# Patient Record
Sex: MALE | Race: WHITE | URBAN - METROPOLITAN AREA
[De-identification: names, ages, dates, MRNs, and addresses within clinical notes are randomized per-mention and may not be internally consistent; named-entity substitution may affect disease eponyms.]

---

## 2017-07-22 ENCOUNTER — EMERGENCY (EMERGENCY)
Facility: HOSPITAL | Age: 47
LOS: 1 days | Discharge: HOME | End: 2017-07-22
Attending: EMERGENCY MEDICINE
Payer: COMMERCIAL

## 2017-07-22 VITALS
RESPIRATION RATE: 16 BRPM | TEMPERATURE: 98 F | SYSTOLIC BLOOD PRESSURE: 146 MMHG | OXYGEN SATURATION: 96 % | DIASTOLIC BLOOD PRESSURE: 94 MMHG | HEART RATE: 90 BPM

## 2017-07-22 VITALS
DIASTOLIC BLOOD PRESSURE: 100 MMHG | TEMPERATURE: 97 F | OXYGEN SATURATION: 93 % | HEART RATE: 96 BPM | HEIGHT: 74 IN | SYSTOLIC BLOOD PRESSURE: 157 MMHG | WEIGHT: 315 LBS | RESPIRATION RATE: 18 BRPM

## 2017-07-22 LAB
ALBUMIN SERPL ELPH-MCNC: 4.5 G/DL — SIGNIFICANT CHANGE UP (ref 3.3–5.2)
ALP SERPL-CCNC: 79 U/L — SIGNIFICANT CHANGE UP (ref 40–120)
ALT FLD-CCNC: 20 U/L — SIGNIFICANT CHANGE UP
ANION GAP SERPL CALC-SCNC: 19 MMOL/L — HIGH (ref 5–17)
AST SERPL-CCNC: 22 U/L — SIGNIFICANT CHANGE UP
BASOPHILS # BLD AUTO: 0 K/UL — SIGNIFICANT CHANGE UP (ref 0–0.2)
BASOPHILS NFR BLD AUTO: 0.1 % — SIGNIFICANT CHANGE UP (ref 0–2)
BILIRUB SERPL-MCNC: 0.3 MG/DL — LOW (ref 0.4–2)
BUN SERPL-MCNC: 11 MG/DL — SIGNIFICANT CHANGE UP (ref 8–20)
CALCIUM SERPL-MCNC: 8.8 MG/DL — SIGNIFICANT CHANGE UP (ref 8.6–10.2)
CHLORIDE SERPL-SCNC: 103 MMOL/L — SIGNIFICANT CHANGE UP (ref 98–107)
CO2 SERPL-SCNC: 21 MMOL/L — LOW (ref 22–29)
CREAT SERPL-MCNC: 1.42 MG/DL — HIGH (ref 0.5–1.3)
EOSINOPHIL # BLD AUTO: 0 K/UL — SIGNIFICANT CHANGE UP (ref 0–0.5)
EOSINOPHIL NFR BLD AUTO: 0.1 % — SIGNIFICANT CHANGE UP (ref 0–5)
ETHANOL SERPL-MCNC: 170 MG/DL — SIGNIFICANT CHANGE UP
GLUCOSE SERPL-MCNC: 127 MG/DL — HIGH (ref 70–115)
HCT VFR BLD CALC: 45.7 % — SIGNIFICANT CHANGE UP (ref 42–52)
HGB BLD-MCNC: 15.5 G/DL — SIGNIFICANT CHANGE UP (ref 14–18)
LYMPHOCYTES # BLD AUTO: 1.7 K/UL — SIGNIFICANT CHANGE UP (ref 1–4.8)
LYMPHOCYTES # BLD AUTO: 14.1 % — LOW (ref 20–55)
MCHC RBC-ENTMCNC: 29.4 PG — SIGNIFICANT CHANGE UP (ref 27–31)
MCHC RBC-ENTMCNC: 33.9 G/DL — SIGNIFICANT CHANGE UP (ref 32–36)
MCV RBC AUTO: 86.7 FL — SIGNIFICANT CHANGE UP (ref 80–94)
MONOCYTES # BLD AUTO: 0.4 K/UL — SIGNIFICANT CHANGE UP (ref 0–0.8)
MONOCYTES NFR BLD AUTO: 3.4 % — SIGNIFICANT CHANGE UP (ref 3–10)
NEUTROPHILS # BLD AUTO: 10 K/UL — HIGH (ref 1.8–8)
NEUTROPHILS NFR BLD AUTO: 81.8 % — HIGH (ref 37–73)
PLATELET # BLD AUTO: 271 K/UL — SIGNIFICANT CHANGE UP (ref 150–400)
POTASSIUM SERPL-MCNC: 3.5 MMOL/L — SIGNIFICANT CHANGE UP (ref 3.5–5.3)
POTASSIUM SERPL-SCNC: 3.5 MMOL/L — SIGNIFICANT CHANGE UP (ref 3.5–5.3)
PROT SERPL-MCNC: 7.4 G/DL — SIGNIFICANT CHANGE UP (ref 6.6–8.7)
RBC # BLD: 5.27 M/UL — SIGNIFICANT CHANGE UP (ref 4.6–6.2)
RBC # FLD: 15.5 % — SIGNIFICANT CHANGE UP (ref 11–15.6)
SODIUM SERPL-SCNC: 143 MMOL/L — SIGNIFICANT CHANGE UP (ref 135–145)
WBC # BLD: 12.2 K/UL — HIGH (ref 4.8–10.8)
WBC # FLD AUTO: 12.2 K/UL — HIGH (ref 4.8–10.8)

## 2017-07-22 PROCEDURE — 96376 TX/PRO/DX INJ SAME DRUG ADON: CPT

## 2017-07-22 PROCEDURE — 96375 TX/PRO/DX INJ NEW DRUG ADDON: CPT

## 2017-07-22 PROCEDURE — 80053 COMPREHEN METABOLIC PANEL: CPT

## 2017-07-22 PROCEDURE — 85027 COMPLETE CBC AUTOMATED: CPT

## 2017-07-22 PROCEDURE — 96374 THER/PROPH/DIAG INJ IV PUSH: CPT

## 2017-07-22 PROCEDURE — 99285 EMERGENCY DEPT VISIT HI MDM: CPT | Mod: 25

## 2017-07-22 PROCEDURE — 80307 DRUG TEST PRSMV CHEM ANLYZR: CPT

## 2017-07-22 PROCEDURE — 36415 COLL VENOUS BLD VENIPUNCTURE: CPT

## 2017-07-22 PROCEDURE — 99284 EMERGENCY DEPT VISIT MOD MDM: CPT | Mod: 25

## 2017-07-22 RX ORDER — DIPHENHYDRAMINE HCL 50 MG
50 CAPSULE ORAL ONCE
Qty: 0 | Refills: 0 | Status: COMPLETED | OUTPATIENT
Start: 2017-07-22 | End: 2017-07-22

## 2017-07-22 RX ORDER — SODIUM CHLORIDE 9 MG/ML
1000 INJECTION INTRAMUSCULAR; INTRAVENOUS; SUBCUTANEOUS ONCE
Qty: 0 | Refills: 0 | Status: COMPLETED | OUTPATIENT
Start: 2017-07-22 | End: 2017-07-22

## 2017-07-22 RX ORDER — HALOPERIDOL DECANOATE 100 MG/ML
5 INJECTION INTRAMUSCULAR ONCE
Qty: 0 | Refills: 0 | Status: COMPLETED | OUTPATIENT
Start: 2017-07-22 | End: 2017-07-22

## 2017-07-22 RX ORDER — SODIUM CHLORIDE 9 MG/ML
3 INJECTION INTRAMUSCULAR; INTRAVENOUS; SUBCUTANEOUS ONCE
Qty: 0 | Refills: 0 | Status: COMPLETED | OUTPATIENT
Start: 2017-07-22 | End: 2017-07-22

## 2017-07-22 RX ADMIN — Medication 2 MILLIGRAM(S): at 06:17

## 2017-07-22 RX ADMIN — Medication 50 MILLIGRAM(S): at 04:45

## 2017-07-22 RX ADMIN — Medication 2 MILLIGRAM(S): at 04:17

## 2017-07-22 RX ADMIN — HALOPERIDOL DECANOATE 5 MILLIGRAM(S): 100 INJECTION INTRAMUSCULAR at 04:45

## 2017-07-22 RX ADMIN — SODIUM CHLORIDE 3 MILLILITER(S): 9 INJECTION INTRAMUSCULAR; INTRAVENOUS; SUBCUTANEOUS at 04:13

## 2017-07-22 RX ADMIN — SODIUM CHLORIDE 1000 MILLILITER(S): 9 INJECTION INTRAMUSCULAR; INTRAVENOUS; SUBCUTANEOUS at 04:14

## 2017-07-22 RX ADMIN — HALOPERIDOL DECANOATE 5 MILLIGRAM(S): 100 INJECTION INTRAMUSCULAR at 06:17

## 2017-07-22 NOTE — ED PROVIDER NOTE - CONSTITUTIONAL, MLM
normal... Well appearing, well nourished, awake, alert, oriented to person, place, time/situation and in no apparent distress, + slurred speech, + ALOOB

## 2017-07-22 NOTE — ED ADULT TRIAGE NOTE - CHIEF COMPLAINT QUOTE
as per ems - paitent was walking talking normal and family came in and found patient on ground - unreposive to their stimulus - as per ems patietn was not responding - as per ems - patient was walking talking normal and family came in and found patient on ground - unreponsive to their stimulus - as per ems patient was not responding - upon ems arrival patient was making noises and was able to talk upon arrival to ER

## 2017-07-22 NOTE — ED PROVIDER NOTE - ENMT, MLM
Airway patent, Nasal mucosa clear. Mouth with normal mucosa. Throat has no vesicles, no oropharyngeal exudates and uvula is midline, no chiara/rhinorrhea

## 2017-07-22 NOTE — ED PROVIDER NOTE - SHIFT CHANGE DETAILS
Assumed care of patient, pending sobriety; patient assessed at this time, awake, ambulating without difficulty, spoke to wife, is on her way to pick him up.

## 2017-07-22 NOTE — ED ADULT NURSE NOTE - CHIEF COMPLAINT QUOTE
as per ems - patient was walking talking normal and family came in and found patient on ground - unreponsive to their stimulus - as per ems patient was not responding - upon ems arrival patient was making noises and was able to talk upon arrival to ER

## 2018-01-06 ENCOUNTER — INPATIENT (INPATIENT)
Facility: HOSPITAL | Age: 48
LOS: 2 days | Discharge: HOME | DRG: 871 | End: 2018-01-09
Attending: STUDENT IN AN ORGANIZED HEALTH CARE EDUCATION/TRAINING PROGRAM | Admitting: STUDENT IN AN ORGANIZED HEALTH CARE EDUCATION/TRAINING PROGRAM
Payer: COMMERCIAL

## 2018-01-06 VITALS
SYSTOLIC BLOOD PRESSURE: 163 MMHG | HEART RATE: 112 BPM | DIASTOLIC BLOOD PRESSURE: 103 MMHG | TEMPERATURE: 98 F | WEIGHT: 315 LBS | HEIGHT: 74 IN | RESPIRATION RATE: 20 BRPM | OXYGEN SATURATION: 91 %

## 2018-01-06 DIAGNOSIS — Z72.0 TOBACCO USE: ICD-10-CM

## 2018-01-06 DIAGNOSIS — I10 ESSENTIAL (PRIMARY) HYPERTENSION: ICD-10-CM

## 2018-01-06 DIAGNOSIS — Z29.9 ENCOUNTER FOR PROPHYLACTIC MEASURES, UNSPECIFIED: ICD-10-CM

## 2018-01-06 DIAGNOSIS — R73.09 OTHER ABNORMAL GLUCOSE: ICD-10-CM

## 2018-01-06 DIAGNOSIS — E66.01 MORBID (SEVERE) OBESITY DUE TO EXCESS CALORIES: ICD-10-CM

## 2018-01-06 DIAGNOSIS — J10.1 INFLUENZA DUE TO OTHER IDENTIFIED INFLUENZA VIRUS WITH OTHER RESPIRATORY MANIFESTATIONS: ICD-10-CM

## 2018-01-06 DIAGNOSIS — R09.02 HYPOXEMIA: ICD-10-CM

## 2018-01-06 DIAGNOSIS — A41.9 SEPSIS, UNSPECIFIED ORGANISM: ICD-10-CM

## 2018-01-06 DIAGNOSIS — F17.200 NICOTINE DEPENDENCE, UNSPECIFIED, UNCOMPLICATED: ICD-10-CM

## 2018-01-06 LAB
ALBUMIN SERPL ELPH-MCNC: 3.3 G/DL — SIGNIFICANT CHANGE UP (ref 3.3–5)
ALP SERPL-CCNC: 76 U/L — SIGNIFICANT CHANGE UP (ref 40–120)
ALT FLD-CCNC: 40 U/L — SIGNIFICANT CHANGE UP (ref 12–78)
ANION GAP SERPL CALC-SCNC: 8 MMOL/L — SIGNIFICANT CHANGE UP (ref 5–17)
AST SERPL-CCNC: 41 U/L — HIGH (ref 15–37)
BASE EXCESS BLDA CALC-SCNC: 4.1 MMOL/L — HIGH (ref -2–2)
BILIRUB SERPL-MCNC: 0.6 MG/DL — SIGNIFICANT CHANGE UP (ref 0.2–1.2)
BLOOD GAS COMMENTS ARTERIAL: SIGNIFICANT CHANGE UP
BLOOD GAS COMMENTS ARTERIAL: SIGNIFICANT CHANGE UP
BUN SERPL-MCNC: 23 MG/DL — SIGNIFICANT CHANGE UP (ref 7–23)
CALCIUM SERPL-MCNC: 8.1 MG/DL — LOW (ref 8.5–10.1)
CHLORIDE SERPL-SCNC: 98 MMOL/L — SIGNIFICANT CHANGE UP (ref 96–108)
CO2 SERPL-SCNC: 31 MMOL/L — SIGNIFICANT CHANGE UP (ref 22–31)
CREAT SERPL-MCNC: 1.3 MG/DL — SIGNIFICANT CHANGE UP (ref 0.5–1.3)
CRP SERPL-MCNC: 3.8 MG/DL — HIGH (ref 0–0.4)
D DIMER BLD IA.RAPID-MCNC: 232 NG/ML DDU — HIGH
FLUAV H1 2009 PAND RNA SPEC QL NAA+PROBE: DETECTED
GLUCOSE SERPL-MCNC: 138 MG/DL — HIGH (ref 70–99)
HCO3 BLDA-SCNC: 28 MMOL/L — HIGH (ref 23–27)
HCT VFR BLD CALC: 52 % — HIGH (ref 39–50)
HGB BLD-MCNC: 16.8 G/DL — SIGNIFICANT CHANGE UP (ref 13–17)
LACTATE SERPL-SCNC: 1.6 MMOL/L — SIGNIFICANT CHANGE UP (ref 0.7–2)
LYMPHOCYTES # BLD AUTO: 4 % — LOW (ref 13–44)
MCHC RBC-ENTMCNC: 28.9 PG — SIGNIFICANT CHANGE UP (ref 27–34)
MCHC RBC-ENTMCNC: 32.3 GM/DL — SIGNIFICANT CHANGE UP (ref 32–36)
MCV RBC AUTO: 89.5 FL — SIGNIFICANT CHANGE UP (ref 80–100)
MONOCYTES NFR BLD AUTO: 8 % — SIGNIFICANT CHANGE UP (ref 1–9)
NEUTROPHILS NFR BLD AUTO: 83 % — HIGH (ref 43–77)
NEUTS BAND # BLD: 3 % — SIGNIFICANT CHANGE UP (ref 0–8)
PCO2 BLDA: 41 MMHG — SIGNIFICANT CHANGE UP (ref 32–46)
PH BLDA: 7.45 — SIGNIFICANT CHANGE UP (ref 7.35–7.45)
PLAT MORPH BLD: NORMAL — SIGNIFICANT CHANGE UP
PLATELET # BLD AUTO: 188 K/UL — SIGNIFICANT CHANGE UP (ref 150–400)
PO2 BLDA: 67 MMHG — LOW (ref 74–108)
POTASSIUM SERPL-MCNC: 3.9 MMOL/L — SIGNIFICANT CHANGE UP (ref 3.5–5.3)
POTASSIUM SERPL-SCNC: 3.9 MMOL/L — SIGNIFICANT CHANGE UP (ref 3.5–5.3)
PROT SERPL-MCNC: 7.2 G/DL — SIGNIFICANT CHANGE UP (ref 6–8.3)
RAPID RVP RESULT: DETECTED
RBC # BLD: 5.81 M/UL — HIGH (ref 4.2–5.8)
RBC # FLD: 15.6 % — HIGH (ref 10.3–14.5)
RBC BLD AUTO: NORMAL — SIGNIFICANT CHANGE UP
SAO2 % BLDA: 93 % — SIGNIFICANT CHANGE UP (ref 92–96)
SODIUM SERPL-SCNC: 137 MMOL/L — SIGNIFICANT CHANGE UP (ref 135–145)
TROPONIN I SERPL-MCNC: <.015 NG/ML — SIGNIFICANT CHANGE UP (ref 0.01–0.04)
VARIANT LYMPHS # BLD: 2 % — SIGNIFICANT CHANGE UP (ref 0–6)
WBC # BLD: 22.8 K/UL — HIGH (ref 3.8–10.5)
WBC # FLD AUTO: 22.8 K/UL — HIGH (ref 3.8–10.5)

## 2018-01-06 PROCEDURE — 99285 EMERGENCY DEPT VISIT HI MDM: CPT

## 2018-01-06 PROCEDURE — 71045 X-RAY EXAM CHEST 1 VIEW: CPT | Mod: 26

## 2018-01-06 PROCEDURE — 12345: CPT | Mod: NC

## 2018-01-06 PROCEDURE — 99223 1ST HOSP IP/OBS HIGH 75: CPT | Mod: AI

## 2018-01-06 PROCEDURE — 71275 CT ANGIOGRAPHY CHEST: CPT | Mod: 26

## 2018-01-06 PROCEDURE — 71046 X-RAY EXAM CHEST 2 VIEWS: CPT | Mod: 26

## 2018-01-06 RX ORDER — IPRATROPIUM/ALBUTEROL SULFATE 18-103MCG
3 AEROSOL WITH ADAPTER (GRAM) INHALATION
Qty: 0 | Refills: 0 | Status: COMPLETED | OUTPATIENT
Start: 2018-01-06 | End: 2018-01-06

## 2018-01-06 RX ORDER — IPRATROPIUM/ALBUTEROL SULFATE 18-103MCG
3 AEROSOL WITH ADAPTER (GRAM) INHALATION EVERY 6 HOURS
Qty: 0 | Refills: 0 | Status: DISCONTINUED | OUTPATIENT
Start: 2018-01-06 | End: 2018-01-06

## 2018-01-06 RX ORDER — ALBUTEROL 90 UG/1
1 AEROSOL, METERED ORAL EVERY 4 HOURS
Qty: 0 | Refills: 0 | Status: DISCONTINUED | OUTPATIENT
Start: 2018-01-06 | End: 2018-01-09

## 2018-01-06 RX ORDER — IBUPROFEN 200 MG
600 TABLET ORAL EVERY 6 HOURS
Qty: 0 | Refills: 0 | Status: DISCONTINUED | OUTPATIENT
Start: 2018-01-06 | End: 2018-01-06

## 2018-01-06 RX ORDER — ACETAMINOPHEN 500 MG
975 TABLET ORAL ONCE
Qty: 0 | Refills: 0 | Status: COMPLETED | OUTPATIENT
Start: 2018-01-06 | End: 2018-01-06

## 2018-01-06 RX ORDER — AZITHROMYCIN 500 MG/1
500 TABLET, FILM COATED ORAL DAILY
Qty: 0 | Refills: 0 | Status: DISCONTINUED | OUTPATIENT
Start: 2018-01-06 | End: 2018-01-09

## 2018-01-06 RX ORDER — SODIUM CHLORIDE 9 MG/ML
1000 INJECTION INTRAMUSCULAR; INTRAVENOUS; SUBCUTANEOUS
Qty: 0 | Refills: 0 | Status: COMPLETED | OUTPATIENT
Start: 2018-01-06 | End: 2018-01-06

## 2018-01-06 RX ORDER — ONDANSETRON 8 MG/1
4 TABLET, FILM COATED ORAL EVERY 6 HOURS
Qty: 0 | Refills: 0 | Status: DISCONTINUED | OUTPATIENT
Start: 2018-01-06 | End: 2018-01-09

## 2018-01-06 RX ORDER — AMLODIPINE BESYLATE 2.5 MG/1
10 TABLET ORAL DAILY
Qty: 0 | Refills: 0 | Status: DISCONTINUED | OUTPATIENT
Start: 2018-01-06 | End: 2018-01-09

## 2018-01-06 RX ORDER — ACETAMINOPHEN 500 MG
650 TABLET ORAL EVERY 6 HOURS
Qty: 0 | Refills: 0 | Status: DISCONTINUED | OUTPATIENT
Start: 2018-01-06 | End: 2018-01-09

## 2018-01-06 RX ORDER — LORATADINE 10 MG/1
10 TABLET ORAL DAILY
Qty: 0 | Refills: 0 | Status: DISCONTINUED | OUTPATIENT
Start: 2018-01-06 | End: 2018-01-09

## 2018-01-06 RX ORDER — INFLUENZA VIRUS VACCINE 15; 15; 15; 15 UG/.5ML; UG/.5ML; UG/.5ML; UG/.5ML
0.5 SUSPENSION INTRAMUSCULAR ONCE
Qty: 0 | Refills: 0 | Status: DISCONTINUED | OUTPATIENT
Start: 2018-01-06 | End: 2018-01-09

## 2018-01-06 RX ORDER — ALBUTEROL 90 UG/1
2.5 AEROSOL, METERED ORAL EVERY 6 HOURS
Qty: 0 | Refills: 0 | Status: DISCONTINUED | OUTPATIENT
Start: 2018-01-06 | End: 2018-01-09

## 2018-01-06 RX ORDER — SODIUM CHLORIDE 9 MG/ML
1000 INJECTION INTRAMUSCULAR; INTRAVENOUS; SUBCUTANEOUS
Qty: 0 | Refills: 0 | Status: DISCONTINUED | OUTPATIENT
Start: 2018-01-06 | End: 2018-01-08

## 2018-01-06 RX ORDER — BUDESONIDE AND FORMOTEROL FUMARATE DIHYDRATE 160; 4.5 UG/1; UG/1
2 AEROSOL RESPIRATORY (INHALATION)
Qty: 0 | Refills: 0 | Status: DISCONTINUED | OUTPATIENT
Start: 2018-01-06 | End: 2018-01-09

## 2018-01-06 RX ORDER — ACETAMINOPHEN 500 MG
650 TABLET ORAL EVERY 6 HOURS
Qty: 0 | Refills: 0 | Status: DISCONTINUED | OUTPATIENT
Start: 2018-01-06 | End: 2018-01-06

## 2018-01-06 RX ORDER — CEFTRIAXONE 500 MG/1
1 INJECTION, POWDER, FOR SOLUTION INTRAMUSCULAR; INTRAVENOUS EVERY 24 HOURS
Qty: 0 | Refills: 0 | Status: DISCONTINUED | OUTPATIENT
Start: 2018-01-07 | End: 2018-01-09

## 2018-01-06 RX ORDER — MORPHINE SULFATE 50 MG/1
2 CAPSULE, EXTENDED RELEASE ORAL ONCE
Qty: 0 | Refills: 0 | Status: DISCONTINUED | OUTPATIENT
Start: 2018-01-06 | End: 2018-01-08

## 2018-01-06 RX ORDER — SODIUM CHLORIDE 0.65 %
1 AEROSOL, SPRAY (ML) NASAL THREE TIMES A DAY
Qty: 0 | Refills: 0 | Status: DISCONTINUED | OUTPATIENT
Start: 2018-01-06 | End: 2018-01-09

## 2018-01-06 RX ORDER — IBUPROFEN 200 MG
600 TABLET ORAL THREE TIMES A DAY
Qty: 0 | Refills: 0 | Status: DISCONTINUED | OUTPATIENT
Start: 2018-01-06 | End: 2018-01-08

## 2018-01-06 RX ORDER — PANTOPRAZOLE SODIUM 20 MG/1
40 TABLET, DELAYED RELEASE ORAL
Qty: 0 | Refills: 0 | Status: DISCONTINUED | OUTPATIENT
Start: 2018-01-06 | End: 2018-01-09

## 2018-01-06 RX ORDER — CEFTRIAXONE 500 MG/1
1 INJECTION, POWDER, FOR SOLUTION INTRAMUSCULAR; INTRAVENOUS ONCE
Qty: 0 | Refills: 0 | Status: COMPLETED | OUTPATIENT
Start: 2018-01-06 | End: 2018-01-06

## 2018-01-06 RX ADMIN — Medication 975 MILLIGRAM(S): at 11:13

## 2018-01-06 RX ADMIN — CEFTRIAXONE 100 GRAM(S): 500 INJECTION, POWDER, FOR SOLUTION INTRAMUSCULAR; INTRAVENOUS at 09:26

## 2018-01-06 RX ADMIN — Medication 1 SPRAY(S): at 21:06

## 2018-01-06 RX ADMIN — Medication 3 MILLILITER(S): at 09:27

## 2018-01-06 RX ADMIN — PANTOPRAZOLE SODIUM 40 MILLIGRAM(S): 20 TABLET, DELAYED RELEASE ORAL at 16:04

## 2018-01-06 RX ADMIN — Medication 3 MILLILITER(S): at 09:00

## 2018-01-06 RX ADMIN — SODIUM CHLORIDE 1000 MILLILITER(S): 9 INJECTION INTRAMUSCULAR; INTRAVENOUS; SUBCUTANEOUS at 10:00

## 2018-01-06 RX ADMIN — AMLODIPINE BESYLATE 10 MILLIGRAM(S): 2.5 TABLET ORAL at 15:59

## 2018-01-06 RX ADMIN — SODIUM CHLORIDE 1000 MILLILITER(S): 9 INJECTION INTRAMUSCULAR; INTRAVENOUS; SUBCUTANEOUS at 09:00

## 2018-01-06 RX ADMIN — Medication 125 MILLIGRAM(S): at 09:00

## 2018-01-06 RX ADMIN — Medication 20 MILLIGRAM(S): at 21:07

## 2018-01-06 RX ADMIN — LORATADINE 10 MILLIGRAM(S): 10 TABLET ORAL at 16:00

## 2018-01-06 RX ADMIN — AZITHROMYCIN 500 MILLIGRAM(S): 500 TABLET, FILM COATED ORAL at 16:04

## 2018-01-06 RX ADMIN — Medication 975 MILLIGRAM(S): at 09:00

## 2018-01-06 RX ADMIN — Medication 75 MILLIGRAM(S): at 14:21

## 2018-01-06 RX ADMIN — ALBUTEROL 2.5 MILLIGRAM(S): 90 AEROSOL, METERED ORAL at 19:52

## 2018-01-06 RX ADMIN — SODIUM CHLORIDE 1000 MILLILITER(S): 9 INJECTION INTRAMUSCULAR; INTRAVENOUS; SUBCUTANEOUS at 11:16

## 2018-01-06 RX ADMIN — Medication 1200 MILLIGRAM(S): at 18:37

## 2018-01-06 RX ADMIN — Medication 600 MILLIGRAM(S): at 19:47

## 2018-01-06 RX ADMIN — BUDESONIDE AND FORMOTEROL FUMARATE DIHYDRATE 2 PUFF(S): 160; 4.5 AEROSOL RESPIRATORY (INHALATION) at 21:06

## 2018-01-06 RX ADMIN — SODIUM CHLORIDE 125 MILLILITER(S): 9 INJECTION INTRAMUSCULAR; INTRAVENOUS; SUBCUTANEOUS at 18:37

## 2018-01-06 NOTE — H&P ADULT - NSHPSOCIALHISTORY_GEN_ALL_CORE
ETOH: "when I drink, I drink" no h/o withdrawal, intermittent binge drinking but no drinks in 7 days since he has been feeling unwell  tobacco: heavy tobacco use since age 11, upwards of 6 packs/day w/ peak use, more recently he has been smoking about 1-2ppd, declines cessation meds  recreational drug use: none  occupation: business owner, formerly worked in stock market  lives w/ spouse performs adl's indepdendently

## 2018-01-06 NOTE — ED ADULT NURSE NOTE - OBJECTIVE STATEMENT
Pt received in bed alert and oriented and resting in bed with the c/o cough and right  upper abd pain x 1 week which seems to be getting increasingly worse. Pt has productive cough but refuses to cough because it causes extreme pain. As per Md's orders IV micah placed blood specimen obtained and sent to the ;ab. Meds given and tolerated well. Pt stable and nursing care ongoing and safety maintained.

## 2018-01-06 NOTE — ED PROVIDER NOTE - OBJECTIVE STATEMENT
pt is a  46 yo male who smokes drinks and occasionally uses marijuana, he is morbidly obese and has hx of htn no psxh no allergies pmd dr bullock his daughter is at home with cold and he developed cold and cough for past few days.  he has been coughing so hard his pmd put him on prednisone.  but today he presents with severe r sided pain with pain on cough and shortness of breath.  has pain despite ibuprofen 800 q 8 h

## 2018-01-06 NOTE — ED ADULT NURSE NOTE - ED STAT RN HANDOFF DETAILS
Pt stable and resting in bed. Pt denies any pain or discomfort as of this time. Pt admitted and handoff report giver to BETITO Alva for continuum of care. No sign of distress noted

## 2018-01-06 NOTE — H&P ADULT - HISTORY OF PRESENT ILLNESS
48yo M w/ PMHx HTN, obesity, heavy smoker presents w/ c/o SOB and fever for the past week. The pt states that his family members have been sick and he has been feeling unwell for the past few days. Pt states that he usually smokes over a pack a day and states that he used to smoke up to 6 packs/day but hasn't smoked for a few days because of coughing, fever, body aches and generalized weakness. He also has been markedly nonadherant to dietary recommendations over the [place]/New Years holiday. He was eating excess amounts of carbs, drinking more alcohol than usual, high salt diet for the past few weeks. He takes a diuretic which he has been taking despite eating less the past few days. Admits R lower chest wall discomfort which is worse w/ deep inspiration and he describes as stabbing. No abd pain. No nausea/vomiting/diarrhea. Cough has been productive of thick clear sputum. Positive sick contacts. No other complaints today    In the ED, he underwent CT angio to r/o PE as his d-dimer was elevated. Also w/ mild tachycardia, leukocytosis. He was given 3L NS bolus as part of sepsis protocol, no SOB after admin. 2 duonebs, tamiflu, solu-medrol, ceftriazone/zithromax and tylenol. Symptoms somewhat improved but he still c/o R lower rib pain.

## 2018-01-06 NOTE — CHART NOTE - NSCHARTNOTEFT_GEN_A_CORE
asked by RN to eval patient with complaints of insomnia attributed to sever coughing and Right rib pain on cough.  Asks for more pain control because he is over 350 lbs.  O AFVSS  exam-refused      A/P    coughing, influenza.  explained pharmacokinetics and MOA of mucinex and hycodan. reccomending additional dose of 5 mL hycodan, verified with pharmacy. PRN Morphine for pain ( one time dose)  patient voiced understanding and agreement to plan,.

## 2018-01-06 NOTE — CONSULT NOTE ADULT - PROBLEM SELECTOR RECOMMENDATION 3
morbid obesity  pt is in the process of losing weight, going to gym  pt has sx and features of AMOS  will initiate trial of CPAP inpatient  discussed with pt and wife  CPAP with nasal pillows on empiric settings  weight loss rec.   sleep hygiene discussed

## 2018-01-06 NOTE — CONSULT NOTE ADULT - PROBLEM SELECTOR RECOMMENDATION 2
Smoker  smoking cess ed and counseling provided  refuses NRT  will add Symbicort, NEBS, Claritin, Mucinex, Systemic Steroids and Zithromax for Bronchitis / COPD ex  cxr clear  procalcitonin noted  will check CR  Leukocytosis likely from outpatient Steroid use  will use Motrin for pain prn and cough regimen as per above  discussed plan of care

## 2018-01-06 NOTE — CHART NOTE - NSCHARTNOTEFT_GEN_A_CORE
Called by RN because patient was complaining of chest pain. Patient seen and examined at bedside. Patient describes pain as 5/10, nonradiating, localized in the chest middle of the chest, nonreproducible, not pleuritic in nature. Made worse with coughing. Denies SOB, palpitation, numbness, tinging, diaphoresis, dizziness.    46yo M w/ PMHx HTN, obesity, heavy smoker presents w/ c/o SOB and fever for the past week. Dx w/ influenza A.      Vital Signs Last 24 Hrs  T(C): 36.7 (06 Jan 2018 23:00), Max: 36.8 (06 Jan 2018 15:45)  T(F): 98.1 (06 Jan 2018 23:00), Max: 98.2 (06 Jan 2018 15:45)  HR: 75 (06 Jan 2018 23:13) (72 - 112)  BP: 166/90 (06 Jan 2018 23:00) (135/71 - 166/90)  RR: 20 (06 Jan 2018 23:00) (18 - 23)  SpO2: 95% (06 Jan 2018 23:13) (91% - 100%)      Physical Exam:  General: obese male, NAD, sitting up in bed  HEENT: NCAT, PERRLA, EOMI bl, moist mucous membranes   Neck: Supple, nontender  Neurology: A&Ox3, MELO x4, nonfocal, sensation intact  Respiratory: CTA B/L  CV: RRR, +S1/S2  Abdominal: Firm protuberant abdomen, tenderness in right upper quadrant and flank area, ND   Extremities: No C/C/E, + peripheral pulses  MSK: Normal ROM, no joint erythema or warmth, no joint swelling   Skin: warm, dry    Assessment and Plan  46yo M w/ PMHx HTN, obesity, heavy smoker presents w/ c/o SOB and fever for the past week. Dx w/ influenza A, now complains of chest pain.  -Chest pain probably not cardiac in nature, patient has been coughing profusely and pain is made worse with coughing.  -Ordered EKG: NSR, rate 75 (preliminary read)  -Ordered cardiac enzymes   -Will continue to monitor    Discussed plan with senior resident Called by RN because patient was complaining of chest pain. Patient seen and examined at bedside. Patient describes pain as 5/10, nonradiating, localized in the chest middle of the chest, nonreproducible, not pleuritic in nature. Made worse with coughing. Denies SOB, palpitation, numbness, tinging, diaphoresis, dizziness.    46yo M w/ PMHx HTN, obesity, heavy smoker presents w/ c/o SOB and fever for the past week. Dx w/ influenza A.      Vital Signs Last 24 Hrs  T(C): 36.7 (06 Jan 2018 23:00), Max: 36.8 (06 Jan 2018 15:45)  T(F): 98.1 (06 Jan 2018 23:00), Max: 98.2 (06 Jan 2018 15:45)  HR: 75 (06 Jan 2018 23:13) (72 - 112)  BP: 166/90 (06 Jan 2018 23:00) (135/71 - 166/90)  RR: 20 (06 Jan 2018 23:00) (18 - 23)  SpO2: 95% (06 Jan 2018 23:13) (91% - 100%)      Physical Exam:  General: obese male, NAD, sitting up in bed  HEENT: NCAT, PERRLA, EOMI bl, moist mucous membranes   Neck: Supple, nontender  Neurology: A&Ox3, MELO x4, nonfocal, sensation intact  Respiratory: CTA B/L  CV: RRR, +S1/S2  Abdominal: Firm protuberant abdomen, tenderness in right upper quadrant and flank area, ND   Extremities: No C/C/E, + peripheral pulses  MSK: Normal ROM, no joint erythema or warmth, no joint swelling   Skin: warm, dry    Assessment and Plan  46yo M w/ PMHx HTN, obesity, heavy smoker presents w/ c/o SOB and fever for the past week. Dx w/ influenza A, now complains of chest pain.  -Chest pain probably not cardiac in nature, patient has been coughing profusely and pain is made worse with coughing.  -Ordered EKG: NSR, rate 75 (preliminary read)  -Ordered cardiac enzymes: Troponin-, CK elevated at 1934, CKMB is 4.2   -Will continue to monitor    Discussed plan with senior resident

## 2018-01-06 NOTE — CONSULT NOTE ADULT - PROBLEM SELECTOR RECOMMENDATION 9
URI and bronchitis  cough and pain due cough  Tamiflu  Cough regimen with mucinex, tessalon perles and hycodan PRN  see Smoker / COPD section for additional management plan

## 2018-01-06 NOTE — ED PROVIDER NOTE - CONSTITUTIONAL, MLM
normal... morbidly obese w male sitting on edge of bed coughing holding r side Well appearing, well nourished, awake, alert, oriented to person, place, time/situation

## 2018-01-06 NOTE — H&P ADULT - PROBLEM SELECTOR PLAN 4
offered cessation therapy, declined  8 mins spent discussing smoking cessation and risk associated w/ continued tobacco use  pt demonstrated adequate understanding and agrees that smoking is harmful and will try to quit

## 2018-01-06 NOTE — H&P ADULT - ATTENDING COMMENTS
The admission plan was discussed in detail with the patient and family members at the bedside. Their questions and concerns were addressed to the best of my ability. They are in agreement with the plan as detailed above. They demonstrated adequate understanding of the counseling which I have provided.

## 2018-01-06 NOTE — ED ADULT NURSE NOTE - CHPI ED SYMPTOMS POS
DIFFICULTY BREATHING/CHEST CONGESTION/COUGH/NASAL CONGESTION/SHORTNESS OF BREATH/DYSPNEA ON EXERTION

## 2018-01-06 NOTE — H&P ADULT - PROBLEM SELECTOR PLAN 1
admit to telemetry  tamiflu q12 x5 days  supportive care, tylenol, morphine for severe pain  NSAID's for moderate pain

## 2018-01-06 NOTE — ED PROVIDER NOTE - RESPIRATORY, MLM
Breath sounds clear and equal bilaterally. but this is limited due to shallow resps pt ahs pain no cvat no bony crep

## 2018-01-06 NOTE — H&P ADULT - PROBLEM SELECTOR PLAN 3
in setting of marked leukocytosis  PE was ruled out w/ negative CT angio  may be superimposed PNA, based on clinical symptoms and hypoxia. influenza A does not typically cause hypoxia. will hydrate and reasssess w/ AM CXR to evaluate further for PNA  Pulm consult, Fatmata

## 2018-01-06 NOTE — H&P ADULT - NSHPPHYSICALEXAM_GEN_ALL_CORE
T(C): 36.4 (01-06-18 @ 14:38), Max: 36.7 (01-06-18 @ 08:23)  HR: 82 (01-06-18 @ 14:38) (72 - 112)  BP: 159/88 (01-06-18 @ 14:38) (158/90 - 165/92)  RR: 23 (01-06-18 @ 14:38) (18 - 23)  SpO2: 98% (01-06-18 @ 14:38) (91% - 99%)    GENERAL: patient appears uncomfortable, diaphoretic, appropriate, no acute distress  EYES: sclera clear, no exudates  ENMT: oropharynx clear without erythema, no exudates, moist mucous membranes  NECK: supple, soft, no thyromegaly noted  LUNGS: good air entry bilaterally, clear to auscultation, symmetric breath sounds, minimal expiratory wheezing noted  HEART: soft S1/S2, regular rate and rhythm, no murmurs noted, no lower extremity edema  GASTROINTESTINAL: abdomen is obese, soft, nontender, nondistended, normoactive bowel sounds, no palpable masses  INTEGUMENT: good skin turgor, no lesions noted  MUSCULOSKELETAL: no clubbing or cyanosis, no obvious deformity  NEUROLOGIC: awake, alert, oriented x3, good muscle tone in 4 extremities, no obvious sensory deficits  PSYCHIATRIC: mood is good, affect is congruent, linear and logical thought process  HEME/LYMPH: no palpable supraclavicular nodules, no obvious ecchymosis or petechiae

## 2018-01-06 NOTE — ED PROVIDER NOTE - ENMT, MLM
Airway patent, Nasal mucosa clear. Mouth with reddened mucosa. Throat has no vesicles, no oropharyngeal exudates and uvula is midline.

## 2018-01-06 NOTE — H&P ADULT - ASSESSMENT
46yo M w/ PMHx HTN, obesity, heavy smoker presents w/ c/o SOB and fever for the past week. Dx w/ influenza A

## 2018-01-06 NOTE — H&P ADULT - NSHPREVIEWOFSYSTEMS_GEN_ALL_CORE
CONSTITUTIONAL: admits fever, chills, fatigue, weakness  HEENT: denies blurred vision, sore throat  SKIN: denies new lesions, rash  CARDIOVASCULAR: denies chest pain, chest pressure, palpitations. admits rib pain on R lower chest wall  RESPIRATORY: as per hpi  GASTROINTESTINAL: denies nausea, vomiting, diarrhea, abdominal pain  GENITOURINARY: denies dysuria, discharge  NEUROLOGICAL: denies numbness, headache, focal weakness  MUSCULOSKELETAL: as per hpi  HEMATOLOGIC: denies gross bleeding, bruising  LYMPHATICS: denies enlarged lymph nodes, extremity swelling  PSYCHIATRIC: denies recent changes in anxiety, depression  ENDOCRINOLOGIC: denies sweating, cold or heat intolerance

## 2018-01-07 DIAGNOSIS — R10.11 RIGHT UPPER QUADRANT PAIN: ICD-10-CM

## 2018-01-07 LAB
ANION GAP SERPL CALC-SCNC: 7 MMOL/L — SIGNIFICANT CHANGE UP (ref 5–17)
BUN SERPL-MCNC: 22 MG/DL — SIGNIFICANT CHANGE UP (ref 7–23)
CALCIUM SERPL-MCNC: 7.8 MG/DL — LOW (ref 8.5–10.1)
CHLORIDE SERPL-SCNC: 105 MMOL/L — SIGNIFICANT CHANGE UP (ref 96–108)
CK MB BLD-MCNC: 0.2 % — SIGNIFICANT CHANGE UP (ref 0–3.5)
CK MB CFR SERPL CALC: 4.2 NG/ML — HIGH (ref 0–3.6)
CK SERPL-CCNC: 1934 U/L — HIGH (ref 26–308)
CO2 SERPL-SCNC: 29 MMOL/L — SIGNIFICANT CHANGE UP (ref 22–31)
CREAT SERPL-MCNC: 1.1 MG/DL — SIGNIFICANT CHANGE UP (ref 0.5–1.3)
GLUCOSE SERPL-MCNC: 122 MG/DL — HIGH (ref 70–99)
HBA1C BLD-MCNC: 5.8 % — HIGH (ref 4–5.6)
HCT VFR BLD CALC: 46.4 % — SIGNIFICANT CHANGE UP (ref 39–50)
HGB BLD-MCNC: 15 G/DL — SIGNIFICANT CHANGE UP (ref 13–17)
LYMPHOCYTES # BLD AUTO: 3 % — LOW (ref 13–44)
MAGNESIUM SERPL-MCNC: 2.5 MG/DL — SIGNIFICANT CHANGE UP (ref 1.6–2.6)
MCHC RBC-ENTMCNC: 29.3 PG — SIGNIFICANT CHANGE UP (ref 27–34)
MCHC RBC-ENTMCNC: 32.3 GM/DL — SIGNIFICANT CHANGE UP (ref 32–36)
MCV RBC AUTO: 90.8 FL — SIGNIFICANT CHANGE UP (ref 80–100)
MONOCYTES NFR BLD AUTO: 14 % — HIGH (ref 1–9)
NEUTROPHILS NFR BLD AUTO: 80 % — HIGH (ref 43–77)
PLATELET # BLD AUTO: 172 K/UL — SIGNIFICANT CHANGE UP (ref 150–400)
POTASSIUM SERPL-MCNC: 4.3 MMOL/L — SIGNIFICANT CHANGE UP (ref 3.5–5.3)
POTASSIUM SERPL-SCNC: 4.3 MMOL/L — SIGNIFICANT CHANGE UP (ref 3.5–5.3)
RBC # BLD: 5.12 M/UL — SIGNIFICANT CHANGE UP (ref 4.2–5.8)
RBC # FLD: 15.8 % — HIGH (ref 10.3–14.5)
SODIUM SERPL-SCNC: 141 MMOL/L — SIGNIFICANT CHANGE UP (ref 135–145)
TROPONIN I SERPL-MCNC: <.015 NG/ML — SIGNIFICANT CHANGE UP (ref 0.01–0.04)
WBC # BLD: 20.4 K/UL — HIGH (ref 3.8–10.5)
WBC # FLD AUTO: 20.4 K/UL — HIGH (ref 3.8–10.5)

## 2018-01-07 PROCEDURE — 99233 SBSQ HOSP IP/OBS HIGH 50: CPT

## 2018-01-07 PROCEDURE — 74176 CT ABD & PELVIS W/O CONTRAST: CPT | Mod: 26

## 2018-01-07 PROCEDURE — 76700 US EXAM ABDOM COMPLETE: CPT | Mod: 26

## 2018-01-07 PROCEDURE — 71045 X-RAY EXAM CHEST 1 VIEW: CPT | Mod: 26

## 2018-01-07 RX ORDER — ACETAMINOPHEN 500 MG
975 TABLET ORAL ONCE
Qty: 0 | Refills: 0 | Status: COMPLETED | OUTPATIENT
Start: 2018-01-07 | End: 2018-01-07

## 2018-01-07 RX ORDER — HYDROMORPHONE HYDROCHLORIDE 2 MG/ML
1 INJECTION INTRAMUSCULAR; INTRAVENOUS; SUBCUTANEOUS EVERY 4 HOURS
Qty: 0 | Refills: 0 | Status: DISCONTINUED | OUTPATIENT
Start: 2018-01-07 | End: 2018-01-08

## 2018-01-07 RX ORDER — AMLODIPINE BESYLATE 2.5 MG/1
1 TABLET ORAL
Qty: 0 | Refills: 0 | COMMUNITY

## 2018-01-07 RX ORDER — POTASSIUM CHLORIDE 20 MEQ
1 PACKET (EA) ORAL
Qty: 0 | Refills: 0 | COMMUNITY

## 2018-01-07 RX ORDER — HYDROMORPHONE HYDROCHLORIDE 2 MG/ML
1 INJECTION INTRAMUSCULAR; INTRAVENOUS; SUBCUTANEOUS ONCE
Qty: 0 | Refills: 0 | Status: DISCONTINUED | OUTPATIENT
Start: 2018-01-07 | End: 2018-01-07

## 2018-01-07 RX ORDER — ALPRAZOLAM 0.25 MG
0.5 TABLET ORAL
Qty: 0 | Refills: 0 | Status: DISCONTINUED | OUTPATIENT
Start: 2018-01-07 | End: 2018-01-08

## 2018-01-07 RX ADMIN — CEFTRIAXONE 100 GRAM(S): 500 INJECTION, POWDER, FOR SOLUTION INTRAMUSCULAR; INTRAVENOUS at 14:03

## 2018-01-07 RX ADMIN — AZITHROMYCIN 500 MILLIGRAM(S): 500 TABLET, FILM COATED ORAL at 13:04

## 2018-01-07 RX ADMIN — Medication 20 MILLIGRAM(S): at 14:21

## 2018-01-07 RX ADMIN — Medication 75 MILLIGRAM(S): at 04:31

## 2018-01-07 RX ADMIN — HYDROMORPHONE HYDROCHLORIDE 1 MILLIGRAM(S): 2 INJECTION INTRAMUSCULAR; INTRAVENOUS; SUBCUTANEOUS at 21:47

## 2018-01-07 RX ADMIN — Medication 1 SPRAY(S): at 21:47

## 2018-01-07 RX ADMIN — AMLODIPINE BESYLATE 10 MILLIGRAM(S): 2.5 TABLET ORAL at 04:31

## 2018-01-07 RX ADMIN — Medication 1 SPRAY(S): at 14:21

## 2018-01-07 RX ADMIN — Medication 20 MILLIGRAM(S): at 21:47

## 2018-01-07 RX ADMIN — Medication 75 MILLIGRAM(S): at 17:46

## 2018-01-07 RX ADMIN — Medication 0.5 MILLIGRAM(S): at 23:36

## 2018-01-07 RX ADMIN — Medication 20 MILLIGRAM(S): at 04:31

## 2018-01-07 RX ADMIN — BUDESONIDE AND FORMOTEROL FUMARATE DIHYDRATE 2 PUFF(S): 160; 4.5 AEROSOL RESPIRATORY (INHALATION) at 12:53

## 2018-01-07 RX ADMIN — HYDROMORPHONE HYDROCHLORIDE 1 MILLIGRAM(S): 2 INJECTION INTRAMUSCULAR; INTRAVENOUS; SUBCUTANEOUS at 16:09

## 2018-01-07 RX ADMIN — HYDROMORPHONE HYDROCHLORIDE 1 MILLIGRAM(S): 2 INJECTION INTRAMUSCULAR; INTRAVENOUS; SUBCUTANEOUS at 14:02

## 2018-01-07 RX ADMIN — Medication 100 MILLIGRAM(S): at 21:47

## 2018-01-07 RX ADMIN — PANTOPRAZOLE SODIUM 40 MILLIGRAM(S): 20 TABLET, DELAYED RELEASE ORAL at 04:31

## 2018-01-07 RX ADMIN — HYDROMORPHONE HYDROCHLORIDE 1 MILLIGRAM(S): 2 INJECTION INTRAMUSCULAR; INTRAVENOUS; SUBCUTANEOUS at 17:46

## 2018-01-07 RX ADMIN — HYDROMORPHONE HYDROCHLORIDE 1 MILLIGRAM(S): 2 INJECTION INTRAMUSCULAR; INTRAVENOUS; SUBCUTANEOUS at 08:53

## 2018-01-07 RX ADMIN — ALBUTEROL 2.5 MILLIGRAM(S): 90 AEROSOL, METERED ORAL at 13:38

## 2018-01-07 RX ADMIN — Medication 975 MILLIGRAM(S): at 02:18

## 2018-01-07 RX ADMIN — HYDROMORPHONE HYDROCHLORIDE 1 MILLIGRAM(S): 2 INJECTION INTRAMUSCULAR; INTRAVENOUS; SUBCUTANEOUS at 06:00

## 2018-01-07 RX ADMIN — Medication 100 MILLIGRAM(S): at 02:17

## 2018-01-07 RX ADMIN — HYDROMORPHONE HYDROCHLORIDE 1 MILLIGRAM(S): 2 INJECTION INTRAMUSCULAR; INTRAVENOUS; SUBCUTANEOUS at 05:25

## 2018-01-07 RX ADMIN — LORATADINE 10 MILLIGRAM(S): 10 TABLET ORAL at 13:04

## 2018-01-07 RX ADMIN — HYDROMORPHONE HYDROCHLORIDE 1 MILLIGRAM(S): 2 INJECTION INTRAMUSCULAR; INTRAVENOUS; SUBCUTANEOUS at 12:54

## 2018-01-07 RX ADMIN — Medication 975 MILLIGRAM(S): at 03:05

## 2018-01-07 RX ADMIN — ALBUTEROL 2.5 MILLIGRAM(S): 90 AEROSOL, METERED ORAL at 00:59

## 2018-01-07 RX ADMIN — HYDROMORPHONE HYDROCHLORIDE 1 MILLIGRAM(S): 2 INJECTION INTRAMUSCULAR; INTRAVENOUS; SUBCUTANEOUS at 22:30

## 2018-01-07 RX ADMIN — ALBUTEROL 2.5 MILLIGRAM(S): 90 AEROSOL, METERED ORAL at 20:03

## 2018-01-07 NOTE — PROGRESS NOTE ADULT - PROBLEM SELECTOR PLAN 1
eval for acute cholecystitis. did not tolerate u/s. pain control w/ dilaudid. urgent ct scan of abd/pelvis without IV contrast due to receiving contrast <24hrs ago for CT angio of chest  also orderd HIDA scan  consult surgeryHedy. Spoke w/ him about plan

## 2018-01-07 NOTE — PROGRESS NOTE ADULT - PROBLEM SELECTOR PLAN 1
emp ABX for LRTI  poss cholecystitis  positive tran's sign  US abd pending  am labs pending  will make NPO except meds and sips of water  will add Dilaudid for pain management PRN  will need Surgery and poss GI eval this am

## 2018-01-07 NOTE — PROGRESS NOTE ADULT - PROBLEM SELECTOR PLAN 2
tamiflu q12 x5 days (day 2)  supportive care, tylenol, morphine for severe pain  NSAID's for moderate pain

## 2018-01-07 NOTE — CONSULT NOTE ADULT - SUBJECTIVE AND OBJECTIVE BOX
48 yo male morbidly obese, heavy smoker who has been coughing and been sick x last week. positive for influenza. I got called to r/o cholecystitis. CT abd showed normal gallbladder, nortmal LFTs, CK 1900.    HPI:  46yo M w/ PMHx HTN, obesity, heavy smoker presents w/ c/o SOB and fever for the past week. The pt states that his family members have been sick and he has been feeling unwell for the past few days. Pt states that he usually smokes over a pack a day and states that he used to smoke up to 6 packs/day but hasn't smoked for a few days because of coughing, fever, body aches and generalized weakness. He also has been markedly nonadherant to dietary recommendations over the [place]/New Years holiday. He was eating excess amounts of carbs, drinking more alcohol than usual, high salt diet for the past few weeks. He takes a diuretic which he has been taking despite eating less the past few days. Admits R lower chest wall discomfort which is worse w/ deep inspiration and he describes as stabbing. No abd pain. No nausea/vomiting/diarrhea. Cough has been productive of thick clear sputum. Positive sick contacts. No other complaints today    In the ED, he underwent CT angio to r/o PE as his d-dimer was elevated. Also w/ mild tachycardia, leukocytosis. He was given 3L NS bolus as part of sepsis protocol, no SOB after admin. 2 duonebs, tamiflu, solu-medrol, ceftriazone/zithromax and tylenol. Symptoms somewhat improved but he still c/o R lower rib pain. (06 Jan 2018 15:26)      PAST MEDICAL & SURGICAL HISTORY:  HTN (hypertension)  Hypertension  No significant past surgical history      REVIEW OF SYSTEMS:    CONSTITUTIONAL: No weakness, fevers or chills  EYES/ENT: No visual changes;  No vertigo or throat pain   NECK: No pain or stiffness  RESPIRATORY: No cough, wheezing, hemoptysis; No shortness of breath  CARDIOVASCULAR: No chest pain or palpitations  GASTROINTESTINAL: No abdominal or epigastric pain. No nausea, vomiting, or hematemesis; No diarrhea or constipation. No melena or hematochezia.  GENITOURINARY: No dysuria, frequency or hematuria  NEUROLOGICAL: No numbness or weakness  SKIN: No itching, burning, rashes, or lesions   All other review of systems is negative unless indicated above.    MEDICATIONS  (STANDING):  ALBUTerol    0.083% 2.5 milliGRAM(s) Nebulizer every 6 hours  amLODIPine   Tablet 10 milliGRAM(s) Oral daily  azithromycin   Tablet 500 milliGRAM(s) Oral daily  buDESOnide 160 MICROgram(s)/formoterol 4.5 MICROgram(s) Inhaler 2 Puff(s) Inhalation two times a day  cefTRIAXone   IVPB 1 Gram(s) IV Intermittent every 24 hours  influenza   Vaccine 0.5 milliLiter(s) IntraMuscular once  loratadine 10 milliGRAM(s) Oral daily  oseltamivir 75 milliGRAM(s) Oral two times a day  pantoprazole    Tablet 40 milliGRAM(s) Oral before breakfast  predniSONE   Tablet 20 milliGRAM(s) Oral three times a day  sodium chloride 0.65% Nasal 1 Spray(s) Both Nostrils three times a day  sodium chloride 0.9%. 1000 milliLiter(s) (125 mL/Hr) IV Continuous <Continuous>    MEDICATIONS  (PRN):  acetaminophen   Tablet 650 milliGRAM(s) Oral every 6 hours PRN For Temp greater than 38 C (100.4 F)  acetaminophen   Tablet. 650 milliGRAM(s) Oral every 6 hours PRN Mild Pain (1 - 3)  ALBUTerol    90 MICROgram(s) HFA Inhaler 1 Puff(s) Inhalation every 4 hours PRN Shortness of Breath and/or Wheezing  benzonatate 100 milliGRAM(s) Oral three times a day PRN Cough  HYDROcodone/homatropine Syrup 5 milliLiter(s) Oral every 6 hours PRN Cough  HYDROmorphone  Injectable 1 milliGRAM(s) IV Push every 4 hours PRN Severe Pain (7 - 10)  ibuprofen  Tablet 600 milliGRAM(s) Oral three times a day PRN severe pain due to coughing  morphine  - Injectable 2 milliGRAM(s) IV Push once PRN Severe Pain (7 - 10)  ondansetron Injectable 4 milliGRAM(s) IV Push every 6 hours PRN Nausea      Allergies    No Known Allergies    Intolerances        SOCIAL HISTORY:    FAMILY HISTORY:  Family history of colon cancer in father (Father): dx in 60&#x27;s      Vital Signs Last 24 Hrs  T(C): 36.7 (07 Jan 2018 12:51), Max: 36.8 (06 Jan 2018 15:45)  T(F): 98 (07 Jan 2018 12:51), Max: 98.3 (07 Jan 2018 07:13)  HR: 78 (07 Jan 2018 13:39) (60 - 100)  BP: 172/108 (07 Jan 2018 12:51) (135/71 - 173/76)  BP(mean): --  RR: 20 (07 Jan 2018 12:51) (18 - 23)  SpO2: 93% (07 Jan 2018 12:51) (92% - 100%)    .  VITAL SIGNS:  T(C): 36.7 (01-07-18 @ 12:51), Max: 36.8 (01-06-18 @ 15:45)  T(F): 98 (01-07-18 @ 12:51), Max: 98.3 (01-07-18 @ 07:13)  HR: 78 (01-07-18 @ 13:39) (60 - 100)  BP: 172/108 (01-07-18 @ 12:51) (135/71 - 173/76)  BP(mean): --  RR: 20 (01-07-18 @ 12:51) (18 - 23)  SpO2: 93% (01-07-18 @ 12:51) (92% - 100%)  Wt(kg): --    PHYSICAL EXAM:    Constitutional: resting comfortably in bed; NAD  Eyes: PERRL, EOMI, anicteric sclera  ENT: no nasal discharge; uvula midline, no oropharyngeal erythema or exudates  Neck: supple; no JVD or thyromegaly  Respiratory: CTA B/L; ronchi/ muscle tenderness rt intercostal muscles  Cardiac: +S1/S2; RRR; no murmurs  Gastrointestinal: soft, NT/ND; no rebound or guarding; +BSx4, obese  Back: spine midline, no bony tenderness or step-offs; no CVAT B/L  Extremities: no clubbing or cyanosis; no peripheral edema  Musculoskeletal: NROM x4; no joint swelling, tenderness or erythema  Vascular: 2+ radial, femoral, DP/PT pulses B/L  Dermatologic: skin warm, dry and intact; no rashes, wounds, or scars  Lymphatic: no submandibular or cervical LAD  Neurologic: AAOx3; CNII-XII grossly intact; no focal deficits  Psychiatric: affect and characteristics of appearance, verbalizations, behaviors are appropriate    LABS:                        15.0   20.4  )-----------( 172      ( 07 Jan 2018 07:31 )             46.4       01-07    141  |  105  |  22  ----------------------------<  122<H>  4.3   |  29  |  1.10    Ca    7.8<L>      07 Jan 2018 07:31  Mg     2.5     01-07    TPro  6.3  /  Alb  2.9<L>  /  TBili  0.4  /  DBili  .10  /  AST  51<H>  /  ALT  57  /  AlkPhos  70  01-07              RADIOLOGY & ADDITIONAL STUDIES:  < from: CT Abdomen and Pelvis No Cont (01.07.18 @ 11:13) >    Impression:    Diverticulosis.  No acute intra-abdominal pathology noted as discussed above.    < end of copied text >
Date/Time Patient Seen:  		  Referring MD:   Data Reviewed	       Patient is a 47y old  Male who presents with a chief complaint of cough    Subjective/HPI  cough, fever, sob, denson, pain due to coughing, malaise, uri and lrti sx  x 1 weeks  positive sick contacts  lives at home  active smoker  works in a bank    pt is a  46 yo male who smokes drinks and occasionally uses marijuana, he is morbidly obese and has hx of htn no psxh no allergies pmd dr bullock his daughter is at home with cold and he developed cold and cough for past few days.  he has been coughing so hard his pmd put him on prednisone.  but today he presents with severe r sided pain with pain on cough and shortness of breath.  has pain despite ibuprofen 800 q 8 h     PAST MEDICAL & SURGICAL HISTORY:  HTN (hypertension)  No significant past surgical history        Medication list         MEDICATIONS  (STANDING):  ALBUTerol    0.083% 2.5 milliGRAM(s) Nebulizer every 6 hours  azithromycin   Tablet 500 milliGRAM(s) Oral daily  buDESOnide 160 MICROgram(s)/formoterol 4.5 MICROgram(s) Inhaler 2 Puff(s) Inhalation two times a day  guaiFENesin ER 1200 milliGRAM(s) Oral every 12 hours  loratadine 10 milliGRAM(s) Oral daily  oseltamivir 75 milliGRAM(s) Oral two times a day    MEDICATIONS  (PRN):  acetaminophen   Tablet 650 milliGRAM(s) Oral every 6 hours PRN For Temp greater than 38 C (100.4 F)  acetaminophen   Tablet. 650 milliGRAM(s) Oral every 6 hours PRN Mild Pain (1 - 3)  benzonatate 100 milliGRAM(s) Oral three times a day PRN Cough  HYDROcodone/homatropine Syrup 5 milliLiter(s) Oral at bedtime PRN Cough  ibuprofen  Tablet 600 milliGRAM(s) Oral three times a day PRN severe pain due to coughing         Vitals log        ICU Vital Signs Last 24 Hrs  T(C): 36.4 (06 Jan 2018 14:38), Max: 36.7 (06 Jan 2018 08:23)  T(F): 97.6 (06 Jan 2018 14:38), Max: 98.1 (06 Jan 2018 08:23)  HR: 82 (06 Jan 2018 14:38) (72 - 112)  BP: 159/88 (06 Jan 2018 14:38) (158/90 - 165/92)  BP(mean): --  ABP: --  ABP(mean): --  RR: 23 (06 Jan 2018 14:38) (18 - 23)  SpO2: 98% (06 Jan 2018 14:38) (91% - 99%)           Input and Output:  I&O's Detail      Lab Data                        16.8   22.8  )-----------( 188      ( 06 Jan 2018 09:26 )             52.0     01-06    137  |  98  |  23  ----------------------------<  138<H>  3.9   |  31  |  1.30    Ca    8.1<L>      06 Jan 2018 09:26    TPro  7.2  /  Alb  3.3  /  TBili  0.6  /  DBili  x   /  AST  41<H>  /  ALT  40  /  AlkPhos  76  01-06    ABG - ( 06 Jan 2018 11:54 )  pH: 7.45  /  pCO2: 41    /  pO2: 67    / HCO3: 28    / Base Excess: 4.1   /  SaO2: 93              smoker  drinker  morbidly obese          Review of Systems	  cough  pain  malaise      Objective     Physical Examination    head at  heart s1s2  chemosis and injection  abd soft and protuberant  extr no edema  lungs no wheezing  moves all extr      Pertinent Lab findings & Imaging      Frederic:  NO   Adequate UO     I&O's Detail           Discussed with:     Cultures:	        Radiology    cxr clear

## 2018-01-07 NOTE — PROGRESS NOTE ADULT - PROBLEM SELECTOR PLAN 2
Flu  Isolation precs  Tamiflu  cough regimen  mucinex, nebs, chest pt, nasal saline  supportive measures

## 2018-01-07 NOTE — CONSULT NOTE ADULT - ASSESSMENT
48 yo male morbidly obese, posit for influenza, heave smoker  -Her doesn't have cholecystitis, normal gallbladder, normal LFTs  -His RUQ pain is due to muscle tenderness,  some muscle stranding seen on CT scan in intercostal muscle. He could have rhaddomyolisis due to constant coughing. CPK 1900. or small muscle tear? due to constant coughing. He could also have pleuritis explaining the constant coughing. Medical management for posit influenza  -Will sign off, no need for HIDA scan.  -Case d/w Hospitalist 46 yo male morbidly obese, posit for influenza, heave smoker  -Her doesn't have cholecystitis, normal gallbladder, normal LFTs  -His RUQ pain is due to muscle tenderness,  some muscle stranding seen on CT scan in intercostal muscle. He could have rhaddomyolisis due to constant coughing. CPK 1900. or small muscle tear? due to constant coughing. He could also have pleuritis explaining the constant coughing. Medical management for posit influenza  -Will sign off, no need for HIDA scan.  -Case d/w Hospitalist  -OK to feed

## 2018-01-08 DIAGNOSIS — J22 UNSPECIFIED ACUTE LOWER RESPIRATORY INFECTION: ICD-10-CM

## 2018-01-08 DIAGNOSIS — R73.03 PREDIABETES: ICD-10-CM

## 2018-01-08 LAB
ALBUMIN SERPL ELPH-MCNC: 2.7 G/DL — LOW (ref 3.3–5)
ALP SERPL-CCNC: 64 U/L — SIGNIFICANT CHANGE UP (ref 40–120)
ALT FLD-CCNC: 45 U/L — SIGNIFICANT CHANGE UP (ref 12–78)
ANION GAP SERPL CALC-SCNC: 6 MMOL/L — SIGNIFICANT CHANGE UP (ref 5–17)
AST SERPL-CCNC: 27 U/L — SIGNIFICANT CHANGE UP (ref 15–37)
BASOPHILS # BLD AUTO: 0.1 K/UL — SIGNIFICANT CHANGE UP (ref 0–0.2)
BASOPHILS NFR BLD AUTO: 0.8 % — SIGNIFICANT CHANGE UP (ref 0–2)
BILIRUB SERPL-MCNC: 0.5 MG/DL — SIGNIFICANT CHANGE UP (ref 0.2–1.2)
BUN SERPL-MCNC: 19 MG/DL — SIGNIFICANT CHANGE UP (ref 7–23)
CALCIUM SERPL-MCNC: 7.9 MG/DL — LOW (ref 8.5–10.1)
CHLORIDE SERPL-SCNC: 103 MMOL/L — SIGNIFICANT CHANGE UP (ref 96–108)
CK SERPL-CCNC: 610 U/L — HIGH (ref 26–308)
CO2 SERPL-SCNC: 32 MMOL/L — HIGH (ref 22–31)
CREAT SERPL-MCNC: 1.1 MG/DL — SIGNIFICANT CHANGE UP (ref 0.5–1.3)
EOSINOPHIL # BLD AUTO: 0 K/UL — SIGNIFICANT CHANGE UP (ref 0–0.5)
EOSINOPHIL NFR BLD AUTO: 0.1 % — SIGNIFICANT CHANGE UP (ref 0–6)
GLUCOSE SERPL-MCNC: 111 MG/DL — HIGH (ref 70–99)
HCT VFR BLD CALC: 43.3 % — SIGNIFICANT CHANGE UP (ref 39–50)
HGB BLD-MCNC: 14.1 G/DL — SIGNIFICANT CHANGE UP (ref 13–17)
LYMPHOCYTES # BLD AUTO: 1.9 K/UL — SIGNIFICANT CHANGE UP (ref 1–3.3)
LYMPHOCYTES # BLD AUTO: 11.8 % — LOW (ref 13–44)
MCHC RBC-ENTMCNC: 29.4 PG — SIGNIFICANT CHANGE UP (ref 27–34)
MCHC RBC-ENTMCNC: 32.5 GM/DL — SIGNIFICANT CHANGE UP (ref 32–36)
MCV RBC AUTO: 90.7 FL — SIGNIFICANT CHANGE UP (ref 80–100)
MONOCYTES # BLD AUTO: 1.3 K/UL — HIGH (ref 0–0.9)
MONOCYTES NFR BLD AUTO: 8.1 % — SIGNIFICANT CHANGE UP (ref 1–9)
NEUTROPHILS # BLD AUTO: 12.5 K/UL — HIGH (ref 1.8–7.4)
NEUTROPHILS NFR BLD AUTO: 79.3 % — HIGH (ref 43–77)
PLATELET # BLD AUTO: 148 K/UL — LOW (ref 150–400)
POTASSIUM SERPL-MCNC: 4.4 MMOL/L — SIGNIFICANT CHANGE UP (ref 3.5–5.3)
POTASSIUM SERPL-SCNC: 4.4 MMOL/L — SIGNIFICANT CHANGE UP (ref 3.5–5.3)
PROT SERPL-MCNC: 6 G/DL — SIGNIFICANT CHANGE UP (ref 6–8.3)
RBC # BLD: 4.78 M/UL — SIGNIFICANT CHANGE UP (ref 4.2–5.8)
RBC # FLD: 15.4 % — HIGH (ref 10.3–14.5)
SODIUM SERPL-SCNC: 141 MMOL/L — SIGNIFICANT CHANGE UP (ref 135–145)
WBC # BLD: 15.7 K/UL — HIGH (ref 3.8–10.5)
WBC # FLD AUTO: 15.7 K/UL — HIGH (ref 3.8–10.5)

## 2018-01-08 PROCEDURE — 99233 SBSQ HOSP IP/OBS HIGH 50: CPT

## 2018-01-08 RX ORDER — MORPHINE SULFATE 50 MG/1
2 CAPSULE, EXTENDED RELEASE ORAL EVERY 4 HOURS
Qty: 0 | Refills: 0 | Status: DISCONTINUED | OUTPATIENT
Start: 2018-01-08 | End: 2018-01-09

## 2018-01-08 RX ORDER — OXYCODONE AND ACETAMINOPHEN 5; 325 MG/1; MG/1
1 TABLET ORAL EVERY 6 HOURS
Qty: 0 | Refills: 0 | Status: DISCONTINUED | OUTPATIENT
Start: 2018-01-08 | End: 2018-01-09

## 2018-01-08 RX ORDER — SODIUM CHLORIDE 9 MG/ML
1000 INJECTION INTRAMUSCULAR; INTRAVENOUS; SUBCUTANEOUS
Qty: 0 | Refills: 0 | Status: DISCONTINUED | OUTPATIENT
Start: 2018-01-08 | End: 2018-01-08

## 2018-01-08 RX ORDER — KETOROLAC TROMETHAMINE 30 MG/ML
10 SYRINGE (ML) INJECTION THREE TIMES A DAY
Qty: 0 | Refills: 0 | Status: DISCONTINUED | OUTPATIENT
Start: 2018-01-08 | End: 2018-01-09

## 2018-01-08 RX ORDER — SODIUM CHLORIDE 9 MG/ML
1000 INJECTION INTRAMUSCULAR; INTRAVENOUS; SUBCUTANEOUS
Qty: 0 | Refills: 0 | Status: DISCONTINUED | OUTPATIENT
Start: 2018-01-08 | End: 2018-01-09

## 2018-01-08 RX ORDER — PSEUDOEPHEDRINE HCL 30 MG
30 TABLET ORAL DAILY
Qty: 0 | Refills: 0 | Status: DISCONTINUED | OUTPATIENT
Start: 2018-01-08 | End: 2018-01-09

## 2018-01-08 RX ADMIN — Medication 30 MILLIGRAM(S): at 11:24

## 2018-01-08 RX ADMIN — BUDESONIDE AND FORMOTEROL FUMARATE DIHYDRATE 2 PUFF(S): 160; 4.5 AEROSOL RESPIRATORY (INHALATION) at 09:01

## 2018-01-08 RX ADMIN — BUDESONIDE AND FORMOTEROL FUMARATE DIHYDRATE 2 PUFF(S): 160; 4.5 AEROSOL RESPIRATORY (INHALATION) at 23:01

## 2018-01-08 RX ADMIN — OXYCODONE AND ACETAMINOPHEN 1 TABLET(S): 5; 325 TABLET ORAL at 20:15

## 2018-01-08 RX ADMIN — HYDROMORPHONE HYDROCHLORIDE 1 MILLIGRAM(S): 2 INJECTION INTRAMUSCULAR; INTRAVENOUS; SUBCUTANEOUS at 02:29

## 2018-01-08 RX ADMIN — OXYCODONE AND ACETAMINOPHEN 1 TABLET(S): 5; 325 TABLET ORAL at 19:45

## 2018-01-08 RX ADMIN — HYDROMORPHONE HYDROCHLORIDE 1 MILLIGRAM(S): 2 INJECTION INTRAMUSCULAR; INTRAVENOUS; SUBCUTANEOUS at 09:15

## 2018-01-08 RX ADMIN — PANTOPRAZOLE SODIUM 40 MILLIGRAM(S): 20 TABLET, DELAYED RELEASE ORAL at 05:34

## 2018-01-08 RX ADMIN — Medication 1 SPRAY(S): at 05:34

## 2018-01-08 RX ADMIN — OXYCODONE AND ACETAMINOPHEN 1 TABLET(S): 5; 325 TABLET ORAL at 13:31

## 2018-01-08 RX ADMIN — Medication 1 SPRAY(S): at 13:31

## 2018-01-08 RX ADMIN — HYDROMORPHONE HYDROCHLORIDE 1 MILLIGRAM(S): 2 INJECTION INTRAMUSCULAR; INTRAVENOUS; SUBCUTANEOUS at 03:07

## 2018-01-08 RX ADMIN — OXYCODONE AND ACETAMINOPHEN 1 TABLET(S): 5; 325 TABLET ORAL at 14:10

## 2018-01-08 RX ADMIN — Medication 650 MILLIGRAM(S): at 23:35

## 2018-01-08 RX ADMIN — Medication 0.5 MILLIGRAM(S): at 23:04

## 2018-01-08 RX ADMIN — AMLODIPINE BESYLATE 10 MILLIGRAM(S): 2.5 TABLET ORAL at 05:32

## 2018-01-08 RX ADMIN — AZITHROMYCIN 500 MILLIGRAM(S): 500 TABLET, FILM COATED ORAL at 11:24

## 2018-01-08 RX ADMIN — LORATADINE 10 MILLIGRAM(S): 10 TABLET ORAL at 11:24

## 2018-01-08 RX ADMIN — Medication 650 MILLIGRAM(S): at 23:05

## 2018-01-08 RX ADMIN — Medication 75 MILLIGRAM(S): at 05:32

## 2018-01-08 RX ADMIN — Medication 75 MILLIGRAM(S): at 17:52

## 2018-01-08 RX ADMIN — HYDROMORPHONE HYDROCHLORIDE 1 MILLIGRAM(S): 2 INJECTION INTRAMUSCULAR; INTRAVENOUS; SUBCUTANEOUS at 09:00

## 2018-01-08 RX ADMIN — Medication 20 MILLIGRAM(S): at 05:32

## 2018-01-08 RX ADMIN — Medication 1 SPRAY(S): at 23:01

## 2018-01-08 RX ADMIN — ALBUTEROL 2.5 MILLIGRAM(S): 90 AEROSOL, METERED ORAL at 19:42

## 2018-01-08 RX ADMIN — ALBUTEROL 2.5 MILLIGRAM(S): 90 AEROSOL, METERED ORAL at 07:57

## 2018-01-08 RX ADMIN — CEFTRIAXONE 100 GRAM(S): 500 INJECTION, POWDER, FOR SOLUTION INTRAMUSCULAR; INTRAVENOUS at 13:31

## 2018-01-08 RX ADMIN — ALBUTEROL 2.5 MILLIGRAM(S): 90 AEROSOL, METERED ORAL at 00:51

## 2018-01-08 NOTE — PROGRESS NOTE ADULT - ASSESSMENT
48yo M w/ PMHx HTN, obesity, heavy smoker presents w/ c/o SOB and fever for the past week. Dx w/ influenza A. Now w/ severe RUQ abd pain.
48yo M w/ PMHx HTN, obesity, heavy smoker presents w/ c/o SOB and fever for the past week. Dx w/ influenza A. Now w/ RUQ abd pain.

## 2018-01-08 NOTE — PROGRESS NOTE ADULT - PROBLEM SELECTOR PLAN 3
Flu  Tamiflu  Supportive care  isolation precs  nebs  mucinex  inhaler symbicort  cough regimen - Hycodan PRN

## 2018-01-08 NOTE — PROGRESS NOTE ADULT - PROBLEM SELECTOR PLAN 5
offered cessation therapy, declined
HTN  cvs regimen  dietary discretion  mobilize  monitor labs
offered cessation therapy, declined

## 2018-01-08 NOTE — DIETITIAN INITIAL EVALUATION ADULT. - FACTORS AFF FOOD INTAKE
Pt experiencing severe RUQ abdominal pain since admission however denies change in appetite stating his stomach "is like a steal can"./none

## 2018-01-08 NOTE — PROGRESS NOTE ADULT - PROBLEM SELECTOR PLAN 2
tamiflu q12 x5 days (day 2)  supportive care, tylenol, morphine for severe pain  NSAID's for moderate pain tamiflu q12 x5 days (day 3)  supportive care, tylenol, percocet, toradol for pain, attempt po formulations of all meds if tolerated

## 2018-01-08 NOTE — DIETITIAN INITIAL EVALUATION ADULT. - PERTINENT MEDS FT
IV Rocephin, Zithromax, Tamiflu, Sodium Chloride 0.9% infusing at 125mL/hour, Prednisone, Protonix, Zofran injectable prn

## 2018-01-08 NOTE — PROGRESS NOTE ADULT - PROBLEM SELECTOR PLAN 6
continue norvasc, d/c diuretics in setting of sepsis, reassess in the AM continue norvasc, resume torsemide

## 2018-01-08 NOTE — PROGRESS NOTE ADULT - PROBLEM SELECTOR PLAN 1
AMOS  features and hx  not tolerating CPAP  will need outpatient follow up  weight loss  sleep hygiene discussed

## 2018-01-08 NOTE — DIETITIAN INITIAL EVALUATION ADULT. - OTHER INFO
Nutrition consult received for BMI >40. PMH includes HTN; pt admitted for hypoxemia and found with influenza A. Pt has been experiencing cough and abdominal pain however reports consuming > 75% at meals. Pt reports intentionally losing 30 pounds x 4 months PTA and states currently weighs 360 pounds. Pt asked questions about general healthful diet and states he plans to continue diet and lifestyle changes to lose more weight. Reports NKFA.

## 2018-01-08 NOTE — DIETITIAN INITIAL EVALUATION ADULT. - NS AS NUTRI INTERV ED CONTENT
Provided general healthful nutrition therapy including importance of controlling portion sizes, limiting intake of salt and saturated fats, and increasing intake of fiber, whole grains, and vegetables.

## 2018-01-08 NOTE — DIETITIAN INITIAL EVALUATION ADULT. - ADHERENCE
fair/Pt has been attempting to lose weight x 4 months by increasing physical activity, working with a , and cutting processed foods and high carbohydrate foods out of diet. States he limits salt intake typically by not adding salt when cooking except for last week PTA.

## 2018-01-08 NOTE — PROGRESS NOTE ADULT - PROBLEM SELECTOR PLAN 1
eval for acute cholecystitis. did not tolerate u/s. pain control w/ dilaudid. urgent ct scan of abd/pelvis without IV contrast due to receiving contrast <24hrs ago for CT angio of chest  also orderd HIDA scan  consult surgeryHedy. Spoke w/ him about plan work up for acute cholecystitis negative and pt's symtoms improved. symptoms attributable to possible muscle strain from persistent coughing but smptoms are improving pt feels better today  surgery consult appreciated

## 2018-01-09 ENCOUNTER — TRANSCRIPTION ENCOUNTER (OUTPATIENT)
Age: 48
End: 2018-01-09

## 2018-01-09 VITALS — HEART RATE: 70 BPM | OXYGEN SATURATION: 94 %

## 2018-01-09 LAB
ANION GAP SERPL CALC-SCNC: 7 MMOL/L — SIGNIFICANT CHANGE UP (ref 5–17)
BUN SERPL-MCNC: 24 MG/DL — HIGH (ref 7–23)
CALCIUM SERPL-MCNC: 7.8 MG/DL — LOW (ref 8.5–10.1)
CHLORIDE SERPL-SCNC: 102 MMOL/L — SIGNIFICANT CHANGE UP (ref 96–108)
CK SERPL-CCNC: 278 U/L — SIGNIFICANT CHANGE UP (ref 26–308)
CO2 SERPL-SCNC: 32 MMOL/L — HIGH (ref 22–31)
CREAT SERPL-MCNC: 1.2 MG/DL — SIGNIFICANT CHANGE UP (ref 0.5–1.3)
GLUCOSE SERPL-MCNC: 100 MG/DL — HIGH (ref 70–99)
HCT VFR BLD CALC: 47.5 % — SIGNIFICANT CHANGE UP (ref 39–50)
HGB BLD-MCNC: 15.2 G/DL — SIGNIFICANT CHANGE UP (ref 13–17)
MCHC RBC-ENTMCNC: 28.4 PG — SIGNIFICANT CHANGE UP (ref 27–34)
MCHC RBC-ENTMCNC: 31.9 GM/DL — LOW (ref 32–36)
MCV RBC AUTO: 89.1 FL — SIGNIFICANT CHANGE UP (ref 80–100)
PLATELET # BLD AUTO: 158 K/UL — SIGNIFICANT CHANGE UP (ref 150–400)
POTASSIUM SERPL-MCNC: 3.9 MMOL/L — SIGNIFICANT CHANGE UP (ref 3.5–5.3)
POTASSIUM SERPL-SCNC: 3.9 MMOL/L — SIGNIFICANT CHANGE UP (ref 3.5–5.3)
RBC # BLD: 5.33 M/UL — SIGNIFICANT CHANGE UP (ref 4.2–5.8)
RBC # FLD: 15.4 % — HIGH (ref 10.3–14.5)
SODIUM SERPL-SCNC: 141 MMOL/L — SIGNIFICANT CHANGE UP (ref 135–145)
WBC # BLD: 13.1 K/UL — HIGH (ref 3.8–10.5)
WBC # FLD AUTO: 13.1 K/UL — HIGH (ref 3.8–10.5)

## 2018-01-09 PROCEDURE — 71275 CT ANGIOGRAPHY CHEST: CPT

## 2018-01-09 PROCEDURE — 85027 COMPLETE CBC AUTOMATED: CPT

## 2018-01-09 PROCEDURE — 80048 BASIC METABOLIC PNL TOTAL CA: CPT

## 2018-01-09 PROCEDURE — 74176 CT ABD & PELVIS W/O CONTRAST: CPT

## 2018-01-09 PROCEDURE — 87633 RESP VIRUS 12-25 TARGETS: CPT

## 2018-01-09 PROCEDURE — 71045 X-RAY EXAM CHEST 1 VIEW: CPT

## 2018-01-09 PROCEDURE — 83036 HEMOGLOBIN GLYCOSYLATED A1C: CPT

## 2018-01-09 PROCEDURE — 87798 DETECT AGENT NOS DNA AMP: CPT

## 2018-01-09 PROCEDURE — 99406 BEHAV CHNG SMOKING 3-10 MIN: CPT

## 2018-01-09 PROCEDURE — 80076 HEPATIC FUNCTION PANEL: CPT

## 2018-01-09 PROCEDURE — 83605 ASSAY OF LACTIC ACID: CPT

## 2018-01-09 PROCEDURE — 99285 EMERGENCY DEPT VISIT HI MDM: CPT | Mod: 25

## 2018-01-09 PROCEDURE — 94660 CPAP INITIATION&MGMT: CPT

## 2018-01-09 PROCEDURE — 71046 X-RAY EXAM CHEST 2 VIEWS: CPT

## 2018-01-09 PROCEDURE — 99239 HOSP IP/OBS DSCHRG MGMT >30: CPT | Mod: 25

## 2018-01-09 PROCEDURE — 80053 COMPREHEN METABOLIC PANEL: CPT

## 2018-01-09 PROCEDURE — 84100 ASSAY OF PHOSPHORUS: CPT

## 2018-01-09 PROCEDURE — 82550 ASSAY OF CK (CPK): CPT

## 2018-01-09 PROCEDURE — 99231 SBSQ HOSP IP/OBS SF/LOW 25: CPT

## 2018-01-09 PROCEDURE — 76700 US EXAM ABDOM COMPLETE: CPT

## 2018-01-09 PROCEDURE — 99221 1ST HOSP IP/OBS SF/LOW 40: CPT

## 2018-01-09 PROCEDURE — 87581 M.PNEUMON DNA AMP PROBE: CPT

## 2018-01-09 PROCEDURE — 84145 PROCALCITONIN (PCT): CPT

## 2018-01-09 PROCEDURE — 96375 TX/PRO/DX INJ NEW DRUG ADDON: CPT

## 2018-01-09 PROCEDURE — 82553 CREATINE MB FRACTION: CPT

## 2018-01-09 PROCEDURE — 36600 WITHDRAWAL OF ARTERIAL BLOOD: CPT

## 2018-01-09 PROCEDURE — 94640 AIRWAY INHALATION TREATMENT: CPT

## 2018-01-09 PROCEDURE — 80061 LIPID PANEL: CPT

## 2018-01-09 PROCEDURE — 86140 C-REACTIVE PROTEIN: CPT

## 2018-01-09 PROCEDURE — 87040 BLOOD CULTURE FOR BACTERIA: CPT

## 2018-01-09 PROCEDURE — 82803 BLOOD GASES ANY COMBINATION: CPT

## 2018-01-09 PROCEDURE — 94760 N-INVAS EAR/PLS OXIMETRY 1: CPT

## 2018-01-09 PROCEDURE — 96365 THER/PROPH/DIAG IV INF INIT: CPT | Mod: 59

## 2018-01-09 PROCEDURE — 83735 ASSAY OF MAGNESIUM: CPT

## 2018-01-09 PROCEDURE — 87486 CHLMYD PNEUM DNA AMP PROBE: CPT

## 2018-01-09 PROCEDURE — 85379 FIBRIN DEGRADATION QUANT: CPT

## 2018-01-09 PROCEDURE — 84484 ASSAY OF TROPONIN QUANT: CPT

## 2018-01-09 RX ORDER — PSEUDOEPHEDRINE HCL 30 MG
1 TABLET ORAL
Qty: 0 | Refills: 0 | COMMUNITY
Start: 2018-01-09

## 2018-01-09 RX ORDER — KETOROLAC TROMETHAMINE 30 MG/ML
1 SYRINGE (ML) INJECTION
Qty: 30 | Refills: 0 | OUTPATIENT
Start: 2018-01-09 | End: 2018-01-18

## 2018-01-09 RX ORDER — ACETAMINOPHEN 500 MG
2 TABLET ORAL
Qty: 0 | Refills: 0 | COMMUNITY
Start: 2018-01-09

## 2018-01-09 RX ORDER — PANTOPRAZOLE SODIUM 20 MG/1
1 TABLET, DELAYED RELEASE ORAL
Qty: 15 | Refills: 0 | OUTPATIENT
Start: 2018-01-09 | End: 2018-01-23

## 2018-01-09 RX ORDER — AZITHROMYCIN 500 MG/1
1 TABLET, FILM COATED ORAL
Qty: 1 | Refills: 0 | OUTPATIENT
Start: 2018-01-09 | End: 2018-01-09

## 2018-01-09 RX ORDER — ONDANSETRON 8 MG/1
1 TABLET, FILM COATED ORAL
Qty: 40 | Refills: 0 | OUTPATIENT
Start: 2018-01-09 | End: 2018-01-18

## 2018-01-09 RX ORDER — CEFUROXIME AXETIL 250 MG
1 TABLET ORAL
Qty: 6 | Refills: 0 | OUTPATIENT
Start: 2018-01-09 | End: 2018-01-11

## 2018-01-09 RX ORDER — SODIUM CHLORIDE 0.65 %
0 AEROSOL, SPRAY (ML) NASAL
Qty: 0 | Refills: 0 | COMMUNITY
Start: 2018-01-09

## 2018-01-09 RX ORDER — LORATADINE 10 MG/1
1 TABLET ORAL
Qty: 30 | Refills: 0 | OUTPATIENT
Start: 2018-01-09 | End: 2018-02-07

## 2018-01-09 RX ORDER — BUDESONIDE AND FORMOTEROL FUMARATE DIHYDRATE 160; 4.5 UG/1; UG/1
1 AEROSOL RESPIRATORY (INHALATION)
Qty: 1 | Refills: 0 | OUTPATIENT
Start: 2018-01-09 | End: 2018-02-07

## 2018-01-09 RX ORDER — ALPRAZOLAM 0.25 MG
1 TABLET ORAL
Qty: 10 | Refills: 0 | OUTPATIENT
Start: 2018-01-09 | End: 2018-01-13

## 2018-01-09 RX ADMIN — Medication 75 MILLIGRAM(S): at 06:03

## 2018-01-09 RX ADMIN — Medication 10 MILLIGRAM(S): at 06:46

## 2018-01-09 RX ADMIN — OXYCODONE AND ACETAMINOPHEN 1 TABLET(S): 5; 325 TABLET ORAL at 01:50

## 2018-01-09 RX ADMIN — Medication 10 MILLIGRAM(S): at 06:02

## 2018-01-09 RX ADMIN — Medication 20 MILLIGRAM(S): at 06:03

## 2018-01-09 RX ADMIN — OXYCODONE AND ACETAMINOPHEN 1 TABLET(S): 5; 325 TABLET ORAL at 01:20

## 2018-01-09 RX ADMIN — PANTOPRAZOLE SODIUM 40 MILLIGRAM(S): 20 TABLET, DELAYED RELEASE ORAL at 06:02

## 2018-01-09 RX ADMIN — OXYCODONE AND ACETAMINOPHEN 1 TABLET(S): 5; 325 TABLET ORAL at 08:47

## 2018-01-09 RX ADMIN — ALBUTEROL 2.5 MILLIGRAM(S): 90 AEROSOL, METERED ORAL at 08:02

## 2018-01-09 RX ADMIN — Medication 20 MILLIGRAM(S): at 06:02

## 2018-01-09 RX ADMIN — AMLODIPINE BESYLATE 10 MILLIGRAM(S): 2.5 TABLET ORAL at 06:02

## 2018-01-09 RX ADMIN — Medication 1 SPRAY(S): at 06:03

## 2018-01-09 RX ADMIN — ALBUTEROL 2.5 MILLIGRAM(S): 90 AEROSOL, METERED ORAL at 00:31

## 2018-01-09 NOTE — PROVIDER CONTACT NOTE (OTHER) - ASSESSMENT
pt asymptomatic
pt A&Ox4. States cp is 5/10, nonradiating, in the middle of chest. pain in abd is due to coughing. pt denies n/v, palpitations, sob, dizziness
pt A&Ox4. denies cp/sob. asyptomatic. vss

## 2018-01-09 NOTE — DISCHARGE NOTE ADULT - NS AS ACTIVITY OBS
No Heavy lifting/straining/can not return to work until being evaluated by your primary care physician

## 2018-01-09 NOTE — PROGRESS NOTE ADULT - PROBLEM SELECTOR PLAN 2
Flu  Tamiflu  isolation precs  supportive Rx regimen, upper airway congestion - sudafed, mucinex, nasal saline

## 2018-01-09 NOTE — PROGRESS NOTE ADULT - PROBLEM SELECTOR PLAN 1
LRTI  on emp ABX  on Tamiflu  would complete a short course of 5 days with ABX  supportive care and regimen  nebs, chest pt, mucinex, sudafed for upper airway congestion, prednisone for COPD,   increase activity  monitor sat on exertion

## 2018-01-09 NOTE — DISCHARGE NOTE ADULT - PLAN OF CARE
Improve symptoms You were diagnosed with influenza A. You were treated with tamiflu. Please continue the medications listed above in the medication reconciliation. Please follow up with your primary care physician in the next 3-5 days to discuss further. It is important to prevent the spread of infection. Avoid close contact with family members until completion of treatment regimen. Cover face while coughing with the sleeve of your shirt. Good hand hygiene is recommended for you and the members of your household. Stay well hydrated. Return for worsening of medication condition. Please do not return to work until you have been seen by your primary care provider. Carbohydrate controlled diet is recommended along with routine exercise regimen It is important to adhere to a new dietary regimen that is low in processed foods, carbohydrates and sugars. Please see your primary care provider for additional information regarding diabetes and pre-diabetes. Weight loss and exercise are important for prevention of progression to diabetes. Smoking cessation You did not smoke while you were in the hospital and you declined tobacco cessation medications. Please continue to abstain from tobacco use. Smoking increases your risk of heart disease, stroke, lung disease and increases your risk of cancer and even death. It is important to adhere to a new dietary regimen that is low in processed foods, carbohydrates and sugars. Please see your primary care provider for additional information regarding diabetes and pre-diabetes. Weight loss and exercise are important for prevention of progression to diabetes. Please review education materials. You did not smoke while you were in the hospital and you declined tobacco cessation medications. Please continue to abstain from tobacco use. Smoking increases your risk of heart disease, stroke, lung disease and increases your risk of cancer and even death. Please review tobacco cessation packet. Symptoms attributable to musculoskeletal related pain, likely from persistent coughing. Continue to stay well hydrated. Take anti-inflammatories as prescribed for discomfort. Take medications with meals to bring reflux symptoms or development of gastric ulcer. Prevent complications This was incidentally found on the CT scan of your abdomen. Resume high fiber diet. I recommend following up with your primary care provider to discuss indications for colonoscopy and evaluation by a gastroenterologist.

## 2018-01-09 NOTE — PROGRESS NOTE ADULT - PROBLEM SELECTOR PLAN 4
AMOS assessment  high risk  has all the features  attempted CPAP trial last night  will need outpatient sleep apnea eval with [facility]
in setting of leukocytosis  PE was ruled out w/ negative CT angio  Pulm consult, Fatmata
LRTI  on emp ABX  on Tamiflu  cough and copd regimen  inhaler  nebs  mucinex and hycodan  pain relief - NSAID and Opioids PRN
in setting of leukocytosis  PE was ruled out w/ negative CT angio  Pulm consult, Fatmata
smoking cess ed  counseling  COPD management with bronchodilators and steroids

## 2018-01-09 NOTE — PROGRESS NOTE ADULT - PROBLEM SELECTOR PROBLEM 3
Sepsis, due to unspecified organism
Influenza A
Morbid obesity
Sepsis, due to unspecified organism
Nicotine abuse

## 2018-01-09 NOTE — DISCHARGE NOTE ADULT - PATIENT PORTAL LINK FT
“You can access the Follow[facility] Patient Portal, offered by [facility], by registering with the following website: [email or web address]”

## 2018-01-09 NOTE — DISCHARGE NOTE ADULT - ADDITIONAL INSTRUCTIONS
Please call to schedule appointment with your primary care provider. I would recommend you also see a pulmonologist and gastroenterologist which can be referred through your primary care provider.

## 2018-01-09 NOTE — DISCHARGE NOTE ADULT - INSTRUCTIONS
Start a carb controlled diet as you have pre-diabetes. Please review the packet regarding Diabetes education to assist with prevention of progression of pre-diabetes to diabetes.

## 2018-01-09 NOTE — DISCHARGE NOTE ADULT - HOSPITAL COURSE
HPI:  46yo M w/ PMHx HTN, obesity, heavy smoker presents w/ c/o SOB and fever for the past week. The pt states that his family members have been sick and he has been feeling unwell for the past few days. Pt states that he usually smokes over a pack a day and states that he used to smoke up to 6 packs/day but hasn't smoked for a few days because of coughing, fever, body aches and generalized weakness. He also has been markedly nonadherant to dietary recommendations over the [place]/New Years holiday. He was eating excess amounts of carbs, drinking more alcohol than usual, high salt diet for the past few weeks. He takes a diuretic which he has been taking despite eating less the past few days. Admits R lower chest wall discomfort which is worse w/ deep inspiration and he describes as stabbing. No abd pain. No nausea/vomiting/diarrhea. Cough has been productive of thick clear sputum. Positive sick contacts. No other complaints today    In the ED, he underwent CT angio to r/o PE as his d-dimer was elevated. Also w/ mild tachycardia, leukocytosis. He was given 3L NS bolus as part of sepsis protocol, no SOB after admin. 2 duonebs, tamiflu, solu-medrol, ceftriazone/zithromax and tylenol. Symptoms somewhat improved but he still c/o R lower rib pain. (06 Jan 2018 15:26)    Patient was admitted to telemetry. Patient was started on ceftriaxone and zithromax to provide empiric coverage for possible superimposed bacterial infectious process. The patient remained hemodynamically stable throughout the admission. He was kept on droplet isolation due to dx of Influenza A. His symptoms slowly improved w/ pain control, nebulizers, tamiflu, antibiotics, IVF and other supportive care. He developed severe RUQ pain. The patient became concerned that he had acute cholecystitis due to severity of pain and multiple family members have had cholecystectomy. He became agitated, was uncooperative with examination and w/ RUQ ultrasound due to severe pain and agitation. CT scan was done which showed no acute intraabd pathology. Pt was reassured and symptoms improved. His pain and anxiety were controlled w/ IV/po medications and symptoms improved. He developed elevated CK which was due to severe coughing fits. Cough suppressants and mucolytics were offered and symptoms improved. HPI:  46yo M w/ PMHx HTN, obesity, heavy smoker presents w/ c/o SOB and fever for the past week. The pt states that his family members have been sick and he has been feeling unwell for the past few days. Pt states that he usually smokes over a pack a day and states that he used to smoke up to 6 packs/day but hasn't smoked for a few days because of coughing, fever, body aches and generalized weakness. He also has been markedly nonadherant to dietary recommendations over the [place]/New Years holiday. He was eating excess amounts of carbs, drinking more alcohol than usual, high salt diet for the past few weeks. He takes a diuretic which he has been taking despite eating less the past few days. Admits R lower chest wall discomfort which is worse w/ deep inspiration and he describes as stabbing. No abd pain. No nausea/vomiting/diarrhea. Cough has been productive of thick clear sputum. Positive sick contacts. No other complaints today    In the ED, he underwent CT angio to r/o PE as his d-dimer was elevated. Also w/ mild tachycardia, leukocytosis. He was given 3L NS bolus as part of sepsis protocol, no SOB after admin. 2 duonebs, tamiflu, solu-medrol, ceftriazone/zithromax and tylenol. Symptoms somewhat improved but he still c/o R lower rib pain. (06 Jan 2018 15:26)    Patient was admitted to telemetry. Patient was started on ceftriaxone and zithromax to provide empiric coverage for possible superimposed bacterial infectious process. The patient remained hemodynamically stable throughout the admission. He was kept on droplet isolation due to dx of Influenza A. His symptoms slowly improved w/ pain control, nebulizers, tamiflu, antibiotics, IVF and other supportive care. He developed severe RUQ pain. The patient became concerned that he had acute cholecystitis due to severity of pain and multiple family members have had cholecystectomy. He became agitated, was uncooperative with examination and w/ RUQ ultrasound due to severe pain and agitation. CT scan was done which showed no acute intraabd pathology. Pt was reassured and symptoms improved. His pain and anxiety were controlled w/ IV/po medications and symptoms improved. He developed elevated CK which was due to severe coughing fits. Cough suppressants and mucolytics were offered and symptoms improved. Discussed follow up plan at length and patient agrees to follow up with primary care physician for follow up next week to discuss these symptoms. Patient's PCP is a family friend.     The total amount of time spent reviewing the hospital notes, laboratory values, imaging findings, assessing/counseling the patient, discussing with consultant physicians, social work, nursing staff took 71 minutes. Additionally 9 minutes were spent discussing the importance of smoking cessation including the risks associated including increased risk of heart disease, stroke, pulmonary disease, debility and death.

## 2018-01-09 NOTE — DISCHARGE NOTE ADULT - CARE PLAN
Principal Discharge DX:	Influenza A  Goal:	Improve symptoms  Instructions for follow-up, activity and diet:	You were diagnosed with influenza A. You were treated with tamiflu. Please continue the medications listed above in the medication reconciliation. Please follow up with your primary care physician in the next 3-5 days to discuss further. It is important to prevent the spread of infection. Avoid close contact with family members until completion of treatment regimen. Cover face while coughing with the sleeve of your shirt. Good hand hygiene is recommended for you and the members of your household. Stay well hydrated. Return for worsening of medication condition. Please do not return to work until you have been seen by your primary care provider.  Secondary Diagnosis:	Pre-diabetes  Goal:	Carbohydrate controlled diet is recommended along with routine exercise regimen  Instructions for follow-up, activity and diet:	It is important to adhere to a new dietary regimen that is low in processed foods, carbohydrates and sugars. Please see your primary care provider for additional information regarding diabetes and pre-diabetes. Weight loss and exercise are important for prevention of progression to diabetes.  Secondary Diagnosis:	Nicotine abuse  Goal:	Smoking cessation  Instructions for follow-up, activity and diet:	You did not smoke while you were in the hospital and you declined tobacco cessation medications. Please continue to abstain from tobacco use. Smoking increases your risk of heart disease, stroke, lung disease and increases your risk of cancer and even death.  Secondary Diagnosis:	RUQ abdominal pain  Secondary Diagnosis:	Diverticulosis of large intestine without hemorrhage Principal Discharge DX:	Influenza A  Goal:	Improve symptoms  Instructions for follow-up, activity and diet:	You were diagnosed with influenza A. You were treated with tamiflu. Please continue the medications listed above in the medication reconciliation. Please follow up with your primary care physician in the next 3-5 days to discuss further. It is important to prevent the spread of infection. Avoid close contact with family members until completion of treatment regimen. Cover face while coughing with the sleeve of your shirt. Good hand hygiene is recommended for you and the members of your household. Stay well hydrated. Return for worsening of medication condition. Please do not return to work until you have been seen by your primary care provider.  Secondary Diagnosis:	Pre-diabetes  Goal:	Carbohydrate controlled diet is recommended along with routine exercise regimen  Instructions for follow-up, activity and diet:	It is important to adhere to a new dietary regimen that is low in processed foods, carbohydrates and sugars. Please see your primary care provider for additional information regarding diabetes and pre-diabetes. Weight loss and exercise are important for prevention of progression to diabetes. Please review education materials.  Secondary Diagnosis:	Nicotine abuse  Goal:	Smoking cessation  Instructions for follow-up, activity and diet:	You did not smoke while you were in the hospital and you declined tobacco cessation medications. Please continue to abstain from tobacco use. Smoking increases your risk of heart disease, stroke, lung disease and increases your risk of cancer and even death. Please review tobacco cessation packet.  Secondary Diagnosis:	RUQ abdominal pain  Goal:	Improve symptoms  Instructions for follow-up, activity and diet:	Symptoms attributable to musculoskeletal related pain, likely from persistent coughing. Continue to stay well hydrated. Take anti-inflammatories as prescribed for discomfort. Take medications with meals to bring reflux symptoms or development of gastric ulcer.  Secondary Diagnosis:	Diverticulosis of large intestine without hemorrhage  Goal:	Prevent complications  Instructions for follow-up, activity and diet:	This was incidentally found on the CT scan of your abdomen. Resume high fiber diet. I recommend following up with your primary care provider to discuss indications for colonoscopy and evaluation by a gastroenterologist.

## 2018-01-09 NOTE — PROGRESS NOTE ADULT - SUBJECTIVE AND OBJECTIVE BOX
Patient is a 47y old  Male who presents with a chief complaint of SOB/fever (06 Jan 2018 15:26)      HPI:  46yo M w/ PMHx HTN, obesity, heavy smoker presents w/ c/o SOB and fever for the past week. The pt states that his family members have been sick and he has been feeling unwell for the past few days. Pt states that he usually smokes over a pack a day and states that he used to smoke up to 6 packs/day but hasn't smoked for a few days because of coughing, fever, body aches and generalized weakness. He also has been markedly nonadherant to dietary recommendations over the [place]/New Years holiday. He was eating excess amounts of carbs, drinking more alcohol than usual, high salt diet for the past few weeks. He takes a diuretic which he has been taking despite eating less the past few days. Admits R lower chest wall discomfort which is worse w/ deep inspiration and he describes as stabbing. No abd pain. No nausea/vomiting/diarrhea. Cough has been productive of thick clear sputum. Positive sick contacts. No other complaints today    In the ED, he underwent CT angio to r/o PE as his d-dimer was elevated. Also w/ mild tachycardia, leukocytosis. He was given 3L NS bolus as part of sepsis protocol, no SOB after admin. 2 duonebs, tamiflu, solu-medrol, ceftriazone/zithromax and tylenol. Symptoms somewhat improved but he still c/o R lower rib pain. (06 Jan 2018 15:26)      INTERVAL HPI/OVERNIGHT EVENTS: Pt seen and evaluated at the bedside. Pt c/o CP and RUQ pain with is severe 10/10, no relief w/ morphine, no vomiting, no other focal complaints at this time. Pt did not tolerate abd u/s due to severe pain.       T(C): 36.8 (01-07-18 @ 07:13), Max: 36.8 (01-06-18 @ 15:45)  HR: 60 (01-07-18 @ 07:13) (60 - 100)  BP: 173/76 (01-07-18 @ 07:13) (135/71 - 173/76)  RR: 20 (01-07-18 @ 07:13) (18 - 23)  SpO2: 92% (01-07-18 @ 07:13) (92% - 100%)  Wt(kg): --  I&O's Summary      REVIEW OF SYSTEMS:  CONSTITUTIONAL: denies fever, chills, fatigue, weakness  HEENT: denies blurred vision, sore throat  SKIN: denies new lesions, rash  CARDIOVASCULAR: denies chest pain, chest pressure, palpitations  RESPIRATORY: denies shortness of breath, sputum production  GASTROINTESTINAL: severe abd pain, no nausea or vomiting  GENITOURINARY: denies dysuria, discharge  NEUROLOGICAL: denies numbness, headache, focal weakness  MUSCULOSKELETAL: denies new joint pain, muscle aches  HEMATOLOGIC: denies gross bleeding, bruising  LYMPHATICS: denies enlarged lymph nodes, extremity swelling  ENDOCRINOLOGIC: denies sweating, cold or heat intolerance    PHYSICAL EXAM:  GENERAL: patient appears diaphoretic, uncomfortable, no acute distress  EYES: sclera clear, no exudates  ENMT: oropharynx clear without erythema, no exudates, moist mucous membranes  NECK: supple, soft, no thyromegaly noted  LUNGS: good air entry bilaterally, clear to auscultation, symmetric breath sounds. trace rhonchi in right lower chest  HEART: soft S1/S2, regular rate and rhythm, no murmurs noted  GASTROINTESTINAL: abdomen is markedly tender in the RUQ, unable to tolerate even light palpation  INTEGUMENT: good skin turgor, no lesions noted, no jaundice  MUSCULOSKELETAL: no clubbing or cyanosis, no obvious deformity  NEUROLOGIC: awake, alert, oriented x3, good muscle tone in 4 extremities, no obvious sensory deficits  PSYCHIATRIC: mood is good, affect is congruent, linear and logical thought process  HEME/LYMPH: no palpable supraclavicular nodules, no obvious ecchymosis or petechiae     MEDICATIONS  (STANDING):  ALBUTerol    0.083% 2.5 milliGRAM(s) Nebulizer every 6 hours  amLODIPine   Tablet 10 milliGRAM(s) Oral daily  azithromycin   Tablet 500 milliGRAM(s) Oral daily  buDESOnide 160 MICROgram(s)/formoterol 4.5 MICROgram(s) Inhaler 2 Puff(s) Inhalation two times a day  cefTRIAXone   IVPB 1 Gram(s) IV Intermittent every 24 hours  guaiFENesin ER 1200 milliGRAM(s) Oral every 12 hours  influenza   Vaccine 0.5 milliLiter(s) IntraMuscular once  loratadine 10 milliGRAM(s) Oral daily  oseltamivir 75 milliGRAM(s) Oral two times a day  pantoprazole    Tablet 40 milliGRAM(s) Oral before breakfast  predniSONE   Tablet 20 milliGRAM(s) Oral three times a day  sodium chloride 0.65% Nasal 1 Spray(s) Both Nostrils three times a day  sodium chloride 0.9%. 1000 milliLiter(s) (125 mL/Hr) IV Continuous <Continuous>    MEDICATIONS  (PRN):  acetaminophen   Tablet 650 milliGRAM(s) Oral every 6 hours PRN For Temp greater than 38 C (100.4 F)  acetaminophen   Tablet. 650 milliGRAM(s) Oral every 6 hours PRN Mild Pain (1 - 3)  ALBUTerol    90 MICROgram(s) HFA Inhaler 1 Puff(s) Inhalation every 4 hours PRN Shortness of Breath and/or Wheezing  benzonatate 100 milliGRAM(s) Oral three times a day PRN Cough  HYDROcodone/homatropine Syrup 5 milliLiter(s) Oral at bedtime PRN Cough  HYDROmorphone  Injectable 1 milliGRAM(s) IV Push every 4 hours PRN Severe Pain (7 - 10)  ibuprofen  Tablet 600 milliGRAM(s) Oral three times a day PRN severe pain due to coughing  morphine  - Injectable 2 milliGRAM(s) IV Push once PRN Severe Pain (7 - 10)  ondansetron Injectable 4 milliGRAM(s) IV Push every 6 hours PRN Nausea      LABS:                        15.0   20.4  )-----------( 172      ( 07 Jan 2018 07:31 )             46.4     01-07    141  |  105  |  22  ----------------------------<  122<H>  4.3   |  29  |  1.10    Ca    7.8<L>      07 Jan 2018 07:31  Mg     2.5     01-07    TPro  7.2  /  Alb  3.3  /  TBili  0.6  /  DBili  x   /  AST  41<H>  /  ALT  40  /  AlkPhos  76  01-06        CAPILLARY BLOOD GLUCOSE        ABG - ( 06 Jan 2018 11:54 )  pH: 7.45  /  pCO2: 41    /  pO2: 67    / HCO3: 28    / Base Excess: 4.1   /  SaO2: 93
Date/Time Patient Seen:  		  Referring MD:   Data Reviewed	       Patient is a 47y old  Male who presents with a chief complaint of SOB/fever (06 Jan 2018 15:26)    in bed  seen and examined  vs and meds reviewed    on ABX      Subjective/HPI     PAST MEDICAL & SURGICAL HISTORY:  HTN (hypertension)  Hypertension  No significant past surgical history        Medication list         MEDICATIONS  (STANDING):  ALBUTerol    0.083% 2.5 milliGRAM(s) Nebulizer every 6 hours  amLODIPine   Tablet 10 milliGRAM(s) Oral daily  azithromycin   Tablet 500 milliGRAM(s) Oral daily  buDESOnide 160 MICROgram(s)/formoterol 4.5 MICROgram(s) Inhaler 2 Puff(s) Inhalation two times a day  cefTRIAXone   IVPB 1 Gram(s) IV Intermittent every 24 hours  influenza   Vaccine 0.5 milliLiter(s) IntraMuscular once  loratadine 10 milliGRAM(s) Oral daily  oseltamivir 75 milliGRAM(s) Oral two times a day  pantoprazole    Tablet 40 milliGRAM(s) Oral before breakfast  predniSONE   Tablet 20 milliGRAM(s) Oral daily  pseudoephedrine 30 milliGRAM(s) Oral daily  sodium chloride 0.65% Nasal 1 Spray(s) Both Nostrils three times a day  sodium chloride 0.9%. 1000 milliLiter(s) (100 mL/Hr) IV Continuous <Continuous>  torsemide 20 milliGRAM(s) Oral daily    MEDICATIONS  (PRN):  acetaminophen   Tablet 650 milliGRAM(s) Oral every 6 hours PRN For Temp greater than 38 C (100.4 F)  acetaminophen   Tablet. 650 milliGRAM(s) Oral every 6 hours PRN Mild Pain (1 - 3)  ALBUTerol    90 MICROgram(s) HFA Inhaler 1 Puff(s) Inhalation every 4 hours PRN Shortness of Breath and/or Wheezing  ALPRAZolam 0.5 milliGRAM(s) Oral two times a day PRN severe anxiety  benzonatate 100 milliGRAM(s) Oral three times a day PRN Cough  HYDROcodone/homatropine Syrup 5 milliLiter(s) Oral every 6 hours PRN Cough  ketorolac 10 milliGRAM(s) Oral three times a day PRN Moderate Pain (4 - 6)  morphine  - Injectable 2 milliGRAM(s) IV Push every 4 hours PRN Severe Pain (7 - 10)  ondansetron Injectable 4 milliGRAM(s) IV Push every 6 hours PRN Nausea  oxyCODONE    5 mG/acetaminophen 325 mG 1 Tablet(s) Oral every 6 hours PRN Severe Pain (7 - 10)         Vitals log        ICU Vital Signs Last 24 Hrs  T(C): 36.7 (09 Jan 2018 04:49), Max: 37.2 (08 Jan 2018 21:21)  T(F): 98.1 (09 Jan 2018 04:49), Max: 98.9 (08 Jan 2018 21:21)  HR: 84 (09 Jan 2018 04:49) (74 - 110)  BP: 132/87 (09 Jan 2018 04:49) (131/83 - 146/83)  BP(mean): --  ABP: --  ABP(mean): --  RR: 23 (09 Jan 2018 04:49) (22 - 23)  SpO2: 94% (09 Jan 2018 04:49) (93% - 96%)           Input and Output:  I&O's Detail      Lab Data                        14.1   15.7  )-----------( 148      ( 08 Jan 2018 07:04 )             43.3     01-08    141  |  103  |  19  ----------------------------<  111<H>  4.4   |  32<H>  |  1.10    Ca    7.9<L>      08 Jan 2018 07:04  Phos  3.6     01-08  Mg     2.5     01-08    TPro  6.0  /  Alb  2.7<L>  /  TBili  0.5  /  DBili  x   /  AST  27  /  ALT  45  /  AlkPhos  64  01-08      CARDIAC MARKERS ( 08 Jan 2018 07:04 )  x     / x     / 610 U/L / x     / x            Review of Systems	      Objective     Physical Examination    head at  heart s1s2  lungs dec BS  abd soft  obese      Pertinent Lab findings & Imaging      De La Garza:  NO   Adequate UO     I&O's Detail           Discussed with:     Cultures:	        Radiology
Date/Time Patient Seen:  		  Referring MD:   Data Reviewed	       Patient is a 47y old  Male who presents with a chief complaint of SOB/fever (06 Jan 2018 15:26)  in bed  seen and examined  not tolerating cpap      Subjective/HPI     PAST MEDICAL & SURGICAL HISTORY:  HTN (hypertension)  Hypertension  No significant past surgical history        Medication list         MEDICATIONS  (STANDING):  ALBUTerol    0.083% 2.5 milliGRAM(s) Nebulizer every 6 hours  amLODIPine   Tablet 10 milliGRAM(s) Oral daily  azithromycin   Tablet 500 milliGRAM(s) Oral daily  buDESOnide 160 MICROgram(s)/formoterol 4.5 MICROgram(s) Inhaler 2 Puff(s) Inhalation two times a day  cefTRIAXone   IVPB 1 Gram(s) IV Intermittent every 24 hours  influenza   Vaccine 0.5 milliLiter(s) IntraMuscular once  loratadine 10 milliGRAM(s) Oral daily  oseltamivir 75 milliGRAM(s) Oral two times a day  pantoprazole    Tablet 40 milliGRAM(s) Oral before breakfast  predniSONE   Tablet 20 milliGRAM(s) Oral daily  sodium chloride 0.65% Nasal 1 Spray(s) Both Nostrils three times a day  sodium chloride 0.9%. 1000 milliLiter(s) (125 mL/Hr) IV Continuous <Continuous>    MEDICATIONS  (PRN):  acetaminophen   Tablet 650 milliGRAM(s) Oral every 6 hours PRN For Temp greater than 38 C (100.4 F)  acetaminophen   Tablet. 650 milliGRAM(s) Oral every 6 hours PRN Mild Pain (1 - 3)  ALBUTerol    90 MICROgram(s) HFA Inhaler 1 Puff(s) Inhalation every 4 hours PRN Shortness of Breath and/or Wheezing  ALPRAZolam 0.5 milliGRAM(s) Oral two times a day PRN severe anxiety  benzonatate 100 milliGRAM(s) Oral three times a day PRN Cough  HYDROcodone/homatropine Syrup 5 milliLiter(s) Oral every 6 hours PRN Cough  HYDROmorphone  Injectable 1 milliGRAM(s) IV Push every 4 hours PRN Severe Pain (7 - 10)  ibuprofen  Tablet 600 milliGRAM(s) Oral three times a day PRN severe pain due to coughing  morphine  - Injectable 2 milliGRAM(s) IV Push once PRN Severe Pain (7 - 10)  ondansetron Injectable 4 milliGRAM(s) IV Push every 6 hours PRN Nausea         Vitals log        ICU Vital Signs Last 24 Hrs  T(C): 36.8 (08 Jan 2018 05:26), Max: 37 (07 Jan 2018 19:50)  T(F): 98.3 (08 Jan 2018 05:26), Max: 98.6 (07 Jan 2018 19:50)  HR: 66 (08 Jan 2018 05:26) (60 - 105)  BP: 169/94 (08 Jan 2018 05:26) (135/83 - 182/138)  BP(mean): --  ABP: --  ABP(mean): --  RR: 20 (08 Jan 2018 05:26) (18 - 20)  SpO2: 93% (08 Jan 2018 05:26) (92% - 94%)           Input and Output:  I&O's Detail      Lab Data                        15.0   20.4  )-----------( 172      ( 07 Jan 2018 07:31 )             46.4     01-07    141  |  105  |  22  ----------------------------<  122<H>  4.3   |  29  |  1.10    Ca    7.8<L>      07 Jan 2018 07:31  Mg     2.5     01-07    TPro  6.3  /  Alb  2.9<L>  /  TBili  0.4  /  DBili  .10  /  AST  51<H>  /  ALT  57  /  AlkPhos  70  01-07    ABG - ( 06 Jan 2018 11:54 )  pH: 7.45  /  pCO2: 41    /  pO2: 67    / HCO3: 28    / Base Excess: 4.1   /  SaO2: 93                CARDIAC MARKERS ( 07 Jan 2018 00:28 )  <.015 ng/mL / x     / 1934 U/L / x     / 4.2 ng/mL  CARDIAC MARKERS ( 06 Jan 2018 15:01 )  <.015 ng/mL / x     / x     / x     / x            Review of Systems	      Objective     Physical Examination    head at  heart s1s2  lungs dec BS  abd soft  obese  cn grossly int  moves all extr      Pertinent Lab findings & Imaging      De La Garza:  NO   Adequate UO     I&O's Detail           Discussed with:     Cultures:	        Radiology
Date/Time Patient Seen:  		  Referring MD:   Data Reviewed	       Patient is a 47y old  Male who presents with a chief complaint of SOB/fever (06 Jan 2018 15:26)  in bed  seen and examined  vs and meds reviewed  on ABX  on IVF  attempted cpap last night  has ruq pain  US abd pending      Subjective/HPI     PAST MEDICAL & SURGICAL HISTORY:  HTN (hypertension)  No significant past surgical history        Medication list         MEDICATIONS  (STANDING):  ALBUTerol    0.083% 2.5 milliGRAM(s) Nebulizer every 6 hours  amLODIPine   Tablet 10 milliGRAM(s) Oral daily  azithromycin   Tablet 500 milliGRAM(s) Oral daily  buDESOnide 160 MICROgram(s)/formoterol 4.5 MICROgram(s) Inhaler 2 Puff(s) Inhalation two times a day  cefTRIAXone   IVPB 1 Gram(s) IV Intermittent every 24 hours  guaiFENesin ER 1200 milliGRAM(s) Oral every 12 hours  influenza   Vaccine 0.5 milliLiter(s) IntraMuscular once  loratadine 10 milliGRAM(s) Oral daily  oseltamivir 75 milliGRAM(s) Oral two times a day  pantoprazole    Tablet 40 milliGRAM(s) Oral before breakfast  predniSONE   Tablet 20 milliGRAM(s) Oral three times a day  sodium chloride 0.65% Nasal 1 Spray(s) Both Nostrils three times a day  sodium chloride 0.9%. 1000 milliLiter(s) (125 mL/Hr) IV Continuous <Continuous>    MEDICATIONS  (PRN):  acetaminophen   Tablet 650 milliGRAM(s) Oral every 6 hours PRN For Temp greater than 38 C (100.4 F)  acetaminophen   Tablet. 650 milliGRAM(s) Oral every 6 hours PRN Mild Pain (1 - 3)  ALBUTerol    90 MICROgram(s) HFA Inhaler 1 Puff(s) Inhalation every 4 hours PRN Shortness of Breath and/or Wheezing  benzonatate 100 milliGRAM(s) Oral three times a day PRN Cough  HYDROcodone/homatropine Syrup 5 milliLiter(s) Oral at bedtime PRN Cough  HYDROmorphone  Injectable 1 milliGRAM(s) IV Push every 4 hours PRN Severe Pain (7 - 10)  ibuprofen  Tablet 600 milliGRAM(s) Oral three times a day PRN severe pain due to coughing  morphine  - Injectable 2 milliGRAM(s) IV Push once PRN Severe Pain (7 - 10)  ondansetron Injectable 4 milliGRAM(s) IV Push every 6 hours PRN Nausea         Vitals log        ICU Vital Signs Last 24 Hrs  T(C): 36.8 (07 Jan 2018 07:13), Max: 36.8 (06 Jan 2018 15:45)  T(F): 98.3 (07 Jan 2018 07:13), Max: 98.3 (07 Jan 2018 07:13)  HR: 60 (07 Jan 2018 07:13) (60 - 112)  BP: 173/76 (07 Jan 2018 07:13) (135/71 - 173/76)  BP(mean): --  ABP: --  ABP(mean): --  RR: 20 (07 Jan 2018 07:13) (18 - 23)  SpO2: 92% (07 Jan 2018 07:13) (91% - 100%)           Input and Output:  I&O's Detail      Lab Data                        16.8   22.8  )-----------( 188      ( 06 Jan 2018 09:26 )             52.0     01-06    137  |  98  |  23  ----------------------------<  138<H>  3.9   |  31  |  1.30    Ca    8.1<L>      06 Jan 2018 09:26    TPro  7.2  /  Alb  3.3  /  TBili  0.6  /  DBili  x   /  AST  41<H>  /  ALT  40  /  AlkPhos  76  01-06    ABG - ( 06 Jan 2018 11:54 )  pH: 7.45  /  pCO2: 41    /  pO2: 67    / HCO3: 28    / Base Excess: 4.1   /  SaO2: 93                CARDIAC MARKERS ( 07 Jan 2018 00:28 )  <.015 ng/mL / x     / 1934 U/L / x     / 4.2 ng/mL  CARDIAC MARKERS ( 06 Jan 2018 15:01 )  <.015 ng/mL / x     / x     / x     / x            Review of Systems	      Objective     Physical Examination  head at  heart s1s2  lungs dec BS  abd RUQ pain and positive tran's sign  cn grossly int  moves all extr        Pertinent Lab findings & Imaging      De La Garza:  NO   Adequate UO     I&O's Detail           Discussed with:     Cultures:	        Radiology
Patient is a 47y old  Male who presents with a chief complaint of SOB/fever (06 Jan 2018 15:26)      HPI:  46yo M w/ PMHx HTN, obesity, heavy smoker presents w/ c/o SOB and fever for the past week. The pt states that his family members have been sick and he has been feeling unwell for the past few days. Pt states that he usually smokes over a pack a day and states that he used to smoke up to 6 packs/day but hasn't smoked for a few days because of coughing, fever, body aches and generalized weakness. He also has been markedly nonadherant to dietary recommendations over the [place]/New Years holiday. He was eating excess amounts of carbs, drinking more alcohol than usual, high salt diet for the past few weeks. He takes a diuretic which he has been taking despite eating less the past few days. Admits R lower chest wall discomfort which is worse w/ deep inspiration and he describes as stabbing. No abd pain. No nausea/vomiting/diarrhea. Cough has been productive of thick clear sputum. Positive sick contacts. No other complaints today    In the ED, he underwent CT angio to r/o PE as his d-dimer was elevated. Also w/ mild tachycardia, leukocytosis. He was given 3L NS bolus as part of sepsis protocol, no SOB after admin. 2 duonebs, tamiflu, solu-medrol, ceftriazone/zithromax and tylenol. Symptoms somewhat improved but he still c/o R lower rib pain. (06 Jan 2018 15:26)      INTERVAL HPI/OVERNIGHT EVENTS: Pt seen and evaluated at the bedside. No acute overnight events occured. Pt experienced no chest pain. RUQ pain has markedly improved, coughing has improved but intermittent still, nonproductive. No fever/chills. No other complaints at this time. Appetite wnl. "I had the best night I've had in a week"      T(C): 37.1 (01-08-18 @ 08:00), Max: 37.1 (01-08-18 @ 08:00)  HR: 80 (01-08-18 @ 08:00) (66 - 105)  BP: 145/80 (01-08-18 @ 08:00) (131/83 - 182/138)  RR: 22 (01-08-18 @ 08:00) (18 - 22)  SpO2: 94% (01-08-18 @ 08:00) (92% - 94%)  Wt(kg): --  I&O's Summary      REVIEW OF SYSTEMS:  CONSTITUTIONAL: denies fever, chills, fatigue, weakness  HEENT: denies blurred vision, sore throat  SKIN: denies new lesions, rash  CARDIOVASCULAR: denies chest pain, chest pressure, palpitations  RESPIRATORY: denies shortness of breath, sputum production  GASTROINTESTINAL: severe abd pain, no nausea or vomiting  GENITOURINARY: denies dysuria, discharge  NEUROLOGICAL: denies numbness, headache, focal weakness  MUSCULOSKELETAL: denies new joint pain, muscle aches  HEMATOLOGIC: denies gross bleeding, bruising  LYMPHATICS: denies enlarged lymph nodes, extremity swelling  ENDOCRINOLOGIC: denies sweating, cold or heat intolerance    PHYSICAL EXAM:  GENERAL: patient appears comfortable, no acute distress  EYES: sclera clear, no exudates  ENMT: oropharynx clear without erythema, no exudates, moist mucous membranes  NECK: supple, soft, no thyromegaly noted  LUNGS: good air entry bilaterally, coarse rhonchi appreciated RLL  HEART: soft S1/S2, regular rate and rhythm, no murmurs noted  GASTROINTESTINAL: abdomen is minimally tender in the RUQ, active bowel sounds  INTEGUMENT: good skin turgor, no lesions noted, no jaundice  MUSCULOSKELETAL: no clubbing or cyanosis, no obvious deformity  NEUROLOGIC: awake, alert, oriented x3, good muscle tone in 4 extremities, no obvious sensory deficits  PSYCHIATRIC: mood is good, affect is congruent, linear and logical thought process  HEME/LYMPH: no palpable supraclavicular nodules, no obvious ecchymosis or petechiae     MEDICATIONS  (STANDING):  ALBUTerol    0.083% 2.5 milliGRAM(s) Nebulizer every 6 hours  amLODIPine   Tablet 10 milliGRAM(s) Oral daily  azithromycin   Tablet 500 milliGRAM(s) Oral daily  buDESOnide 160 MICROgram(s)/formoterol 4.5 MICROgram(s) Inhaler 2 Puff(s) Inhalation two times a day  cefTRIAXone   IVPB 1 Gram(s) IV Intermittent every 24 hours  influenza   Vaccine 0.5 milliLiter(s) IntraMuscular once  loratadine 10 milliGRAM(s) Oral daily  oseltamivir 75 milliGRAM(s) Oral two times a day  pantoprazole    Tablet 40 milliGRAM(s) Oral before breakfast  predniSONE   Tablet 20 milliGRAM(s) Oral daily  pseudoephedrine 30 milliGRAM(s) Oral daily  sodium chloride 0.65% Nasal 1 Spray(s) Both Nostrils three times a day  sodium chloride 0.9%. 1000 milliLiter(s) (125 mL/Hr) IV Continuous <Continuous>  sodium chloride 0.9%. 1000 milliLiter(s) (125 mL/Hr) IV Continuous <Continuous>    MEDICATIONS  (PRN):  acetaminophen   Tablet 650 milliGRAM(s) Oral every 6 hours PRN For Temp greater than 38 C (100.4 F)  acetaminophen   Tablet. 650 milliGRAM(s) Oral every 6 hours PRN Mild Pain (1 - 3)  ALBUTerol    90 MICROgram(s) HFA Inhaler 1 Puff(s) Inhalation every 4 hours PRN Shortness of Breath and/or Wheezing  ALPRAZolam 0.5 milliGRAM(s) Oral two times a day PRN severe anxiety  benzonatate 100 milliGRAM(s) Oral three times a day PRN Cough  HYDROcodone/homatropine Syrup 5 milliLiter(s) Oral every 6 hours PRN Cough  HYDROmorphone  Injectable 1 milliGRAM(s) IV Push every 4 hours PRN Severe Pain (7 - 10)  ibuprofen  Tablet 600 milliGRAM(s) Oral three times a day PRN severe pain due to coughing  morphine  - Injectable 2 milliGRAM(s) IV Push once PRN Severe Pain (7 - 10)  ondansetron Injectable 4 milliGRAM(s) IV Push every 6 hours PRN Nausea      LABS:                        14.1   15.7  )-----------( 148      ( 08 Jan 2018 07:04 )             43.3     01-08    141  |  103  |  19  ----------------------------<  111<H>  4.4   |  32<H>  |  1.10    Ca    7.9<L>      08 Jan 2018 07:04  Phos  3.6     01-08  Mg     2.5     01-08    TPro  6.0  /  Alb  2.7<L>  /  TBili  0.5  /  DBili  x   /  AST  27  /  ALT  45  /  AlkPhos  64  01-08        CAPILLARY BLOOD GLUCOSE        ABG - ( 06 Jan 2018 11:54 )  pH: 7.45  /  pCO2: 41    /  pO2: 67    / HCO3: 28    / Base Excess: 4.1   /  SaO2: 93                01-06 @ 16:25   No growth to date.  --  --

## 2018-01-09 NOTE — DISCHARGE NOTE ADULT - OTHER SIGNIFICANT FINDINGS
On day of discharge:    HPI: Pt is feeling well. Has persistent mild discomfort in RUQ but this has markedly improved and he tolerated oral regimen of pain control well without further problems. No other complaints. Cough has almost resolved. He slept well. No constipation or diarrhea.     ROS:   CONSTITUTIONAL: denies fever, chills, fatigue, weakness  HEENT: denies blurred vision, sore throat  SKIN: denies new lesions, rash  CARDIOVASCULAR: denies chest pain, chest pressure, palpitations  RESPIRATORY: as per hpi  GASTROINTESTINAL: denies nausea, vomiting, diarrhea, abdominal pain  GENITOURINARY: denies dysuria, discharge  NEUROLOGICAL: denies numbness, headache, focal weakness  MUSCULOSKELETAL: denies new joint pain. otherwise as per hpi  HEMATOLOGIC: denies gross bleeding, bruising  LYMPHATICS: denies enlarged lymph nodes, extremity swelling  PSYCHIATRIC: denies recent changes in anxiety, depression  ENDOCRINOLOGIC: denies sweating, cold or heat intolerance    PE:   T(C): 36.7 (01-09-18 @ 08:28), Max: 37.2 (01-08-18 @ 21:21)  HR: 70 (01-09-18 @ 08:30) (70 - 110)  BP: 160/95 (01-09-18 @ 08:28) (132/87 - 160/95)  RR: 22 (01-09-18 @ 08:28) (22 - 23)  SpO2: 94% (01-09-18 @ 08:30) (93% - 96%)    GENERAL: patient appears well, no acute distress, appropriate, pleasant  EYES: sclera clear, no exudates  ENMT: oropharynx clear without erythema, no exudates, moist mucous membranes  NECK: supple, soft, no thyromegaly noted  LUNGS: good air entry bilaterally, clear to auscultation, symmetric breath sounds. trace bibasilar rhonchi noted.   HEART: soft S1/S2, regular rate and rhythm, no murmurs noted, no lower extremity edema  GASTROINTESTINAL: abdomen is minimally tender in the RUQ, no distension, no redness on skin, normoactive bowel sounds  INTEGUMENT: good skin turgor, no lesions noted  MUSCULOSKELETAL: no clubbing or cyanosis, no obvious deformity  NEUROLOGIC: awake, alert, oriented x3, good muscle tone in 4 extremities, no obvious sensory deficits  PSYCHIATRIC: mood is good, affect is congruent, linear and logical thought process  HEME/LYMPH: no palpable supraclavicular nodules, no obvious ecchymosis or petechiae

## 2018-01-09 NOTE — DISCHARGE NOTE ADULT - MEDICATION SUMMARY - MEDICATIONS TO TAKE
I will START or STAY ON the medications listed below when I get home from the hospital:    predniSONE 20 mg oral tablet  -- 1 tab(s) by mouth once a day  -- Indication: For Shortness of breath (short course steroid taper)    acetaminophen 325 mg oral tablet  -- 2 tab(s) by mouth every 6 hours, As needed, For Temp greater than 38 C (100.4 F)  -- Indication: For Possible fever    acetaminophen 325 mg oral tablet  -- 2 tab(s) by mouth every 6 hours, As needed, Mild Pain (1 - 3)  -- Indication: For RUQ abdominal pain    ketorolac 10 mg oral tablet  -- 1 tab(s) by mouth 3 times a day, As needed, Moderate Pain (4 - 6)  -- Indication: For RUQ abdominal pain    oxyCODONE-acetaminophen 5 mg-325 mg oral tablet  -- 1 tab(s) by mouth every 6 hours, As needed, Severe Pain (7 - 10) MDD:4  -- Indication: For RUQ abdominal pain    Zofran 4 mg oral tablet  -- 1 tab(s) by mouth 4 times a day only as needed for nausea or vomiting  -- Indication: For Nausea    loratadine 10 mg oral tablet  -- 1 tab(s) by mouth once a day for seasonal allergic rhinitis symptoms  -- Indication: For Seasonal allergic rhinitis    benzonatate 100 mg oral capsule  -- 1 cap(s) by mouth 3 times a day, As needed, Cough  -- Indication: For Coughing    oseltamivir 75 mg oral capsule  -- 1 cap(s) by mouth 2 times a day  -- Indication: For Flu    ALPRAZolam 0.5 mg oral tablet  -- 1 tab(s) by mouth 2 times a day, As needed, severe anxiety MDD:2  -- Indication: For Anxiety    budesonide-formoterol 160 mcg-4.5 mcg/inh inhalation aerosol  -- 1 application inhaled once a day   -- Indication: For Shortness of breath, history of smoking    amLODIPine 10 mg oral tablet  -- 1 tab(s) by mouth once a day  -- Indication: For High blood pressure    cefuroxime 500 mg oral tablet  -- 1 tab(s) by mouth every 12 hours   -- Finish all this medication unless otherwise directed by prescriber.  Medication should be taken with plenty of water.  Take with food or milk.    -- Indication: For Pneumonia/Coughing    pseudoephedrine 30 mg oral tablet  -- 1 tab(s) by mouth once a day  -- Indication: For Coughing    torsemide 20 mg oral tablet  -- 1 tab(s) by mouth once a day  -- Indication: For High blood pressure, leg swelling    azithromycin 500 mg oral tablet  -- 1 tab(s) by mouth once a day  -- Indication: For Pneumonia/Flu/Coughing    potassium chloride 20 mEq oral tablet, extended release  -- 1 milliequivalent(s) by mouth once a day  -- Indication: For Potassium supplement    sodium chloride 0.65% nasal spray  --  into nose   -- Indication: For Seasonal allergic rhinitis    pantoprazole 40 mg oral delayed release tablet  -- 1 tab(s) by mouth once a day (before a meal)  -- Indication: For Reflux associated with anti-inflammatories

## 2018-01-09 NOTE — PROGRESS NOTE ADULT - PROBLEM SELECTOR PROBLEM 1
Lower resp. tract infection
Morbid obesity
RUQ abdominal pain
Sepsis, due to unspecified organism
RUQ abdominal pain

## 2018-01-11 LAB
CULTURE RESULTS: SIGNIFICANT CHANGE UP
CULTURE RESULTS: SIGNIFICANT CHANGE UP
SPECIMEN SOURCE: SIGNIFICANT CHANGE UP
SPECIMEN SOURCE: SIGNIFICANT CHANGE UP

## 2018-01-12 ENCOUNTER — EMERGENCY (EMERGENCY)
Facility: HOSPITAL | Age: 48
LOS: 1 days | Discharge: HOME | End: 2018-01-12
Attending: EMERGENCY MEDICINE | Admitting: EMERGENCY MEDICINE
Payer: COMMERCIAL

## 2018-01-12 VITALS
SYSTOLIC BLOOD PRESSURE: 170 MMHG | DIASTOLIC BLOOD PRESSURE: 106 MMHG | RESPIRATION RATE: 20 BRPM | OXYGEN SATURATION: 95 % | TEMPERATURE: 98 F | HEART RATE: 111 BPM

## 2018-01-12 LAB
BASOPHILS # BLD AUTO: 0.1 K/UL — SIGNIFICANT CHANGE UP (ref 0–0.2)
BASOPHILS NFR BLD AUTO: 0.5 % — SIGNIFICANT CHANGE UP (ref 0–2)
EOSINOPHIL # BLD AUTO: 0.2 K/UL — SIGNIFICANT CHANGE UP (ref 0–0.5)
EOSINOPHIL NFR BLD AUTO: 1.6 % — SIGNIFICANT CHANGE UP (ref 0–6)
GAS PNL BLDV: SIGNIFICANT CHANGE UP
HCT VFR BLD CALC: 44 % — SIGNIFICANT CHANGE UP (ref 39–50)
HGB BLD-MCNC: 14.2 G/DL — SIGNIFICANT CHANGE UP (ref 13–17)
LYMPHOCYTES # BLD AUTO: 17.6 % — SIGNIFICANT CHANGE UP (ref 13–44)
LYMPHOCYTES # BLD AUTO: 2.3 K/UL — SIGNIFICANT CHANGE UP (ref 1–3.3)
MCHC RBC-ENTMCNC: 29.4 PG — SIGNIFICANT CHANGE UP (ref 27–34)
MCHC RBC-ENTMCNC: 32.3 GM/DL — SIGNIFICANT CHANGE UP (ref 32–36)
MCV RBC AUTO: 91 FL — SIGNIFICANT CHANGE UP (ref 80–100)
MONOCYTES # BLD AUTO: 0.6 K/UL — SIGNIFICANT CHANGE UP (ref 0–0.9)
MONOCYTES NFR BLD AUTO: 4.9 % — SIGNIFICANT CHANGE UP (ref 2–14)
NEUTROPHILS # BLD AUTO: 9.8 K/UL — HIGH (ref 1.8–7.4)
NEUTROPHILS NFR BLD AUTO: 75.4 % — SIGNIFICANT CHANGE UP (ref 43–77)
PLATELET # BLD AUTO: 199 K/UL — SIGNIFICANT CHANGE UP (ref 150–400)
RBC # BLD: 4.84 M/UL — SIGNIFICANT CHANGE UP (ref 4.2–5.8)
RBC # FLD: 14.6 % — HIGH (ref 10.3–14.5)
WBC # BLD: 13.1 K/UL — HIGH (ref 3.8–10.5)
WBC # FLD AUTO: 13.1 K/UL — HIGH (ref 3.8–10.5)

## 2018-01-12 PROCEDURE — 99285 EMERGENCY DEPT VISIT HI MDM: CPT

## 2018-01-12 PROCEDURE — 71046 X-RAY EXAM CHEST 2 VIEWS: CPT | Mod: 26

## 2018-01-12 RX ORDER — OXYCODONE HYDROCHLORIDE 5 MG/1
10 TABLET ORAL ONCE
Qty: 0 | Refills: 0 | Status: DISCONTINUED | OUTPATIENT
Start: 2018-01-12 | End: 2018-01-12

## 2018-01-12 RX ORDER — IPRATROPIUM/ALBUTEROL SULFATE 18-103MCG
3 AEROSOL WITH ADAPTER (GRAM) INHALATION ONCE
Qty: 0 | Refills: 0 | Status: COMPLETED | OUTPATIENT
Start: 2018-01-12 | End: 2018-01-12

## 2018-01-12 RX ADMIN — OXYCODONE HYDROCHLORIDE 10 MILLIGRAM(S): 5 TABLET ORAL at 23:45

## 2018-01-12 NOTE — ED ADULT NURSE NOTE - GENITOURINARY ASSESSMENT
Ht.     60"        Wt.    34.5    kg               BMI     14.8             IBW 55      kg               Pt is at   63 %  IBW WDL

## 2018-01-12 NOTE — ED PROVIDER NOTE - ATTENDING CONTRIBUTION TO CARE
48yo M with superficial ecchymosis abdomen.  No hx of trauma, no anticoagulants.  NAD, completely nontoxic, afebrile, NCAT, PERRL, CN grossly intact, S1S2, CTAB, abd soft nontender with no rebound/guarding/masses/diffuse abdominal ecchymoses, L hand ecchymosis dorsum hand, no LE edema.  IVF, labs, cxr, reassess.

## 2018-01-12 NOTE — ED PROVIDER NOTE - MEDICAL DECISION MAKING DETAILS
ecchymosis likely from severe episdoes of coughing and muscle tear, not on A/C and no h/o liver disease. will check platelets/INR. still with sx from flu, will get CXR to make sure no superimposed PNA. no concern for RP bleed as ecchymosis is spreading in superficial soft tissue not a cullens/grayturner sign, if H/H low will scan but unlikely significant bleeding.

## 2018-01-12 NOTE — ED ADULT NURSE NOTE - OBJECTIVE STATEMENT
48yo M axox3 received ambulatory C/O severe RUQ/rt lower chest wall  pain associated with  2 days ago developed bruise to rt flank, sleeps on left side now spread across abdomen and developed bruise. patient abdomen soft nontender nondistended. respiration even unlabored lung field clear bilaterally. plan of care explained to patient will monitor support and safety provided.

## 2018-01-12 NOTE — ED PROVIDER NOTE - CARE PLAN
Principal Discharge DX:	Ecchymosis  Instructions for follow-up, activity and diet:	1. Return to ED for worsening, progressive or any other concerning symptoms   2. Follow up with your primary care doctor in 2-3days   3. Take Tylenol up to 650 mg every 6 hours as needed for pain.   4. Take oxycodone 5 mg every 6 hours as needed for severe pain; do not drink alcohol while taking this medication.   5. Use the albuterol every 4-6 hours as needed for cough/shortness of breathe  Secondary Diagnosis:	Cough  Secondary Diagnosis:	Muscle tear

## 2018-01-12 NOTE — ED PROVIDER NOTE - OBJECTIVE STATEMENT
46yo M with recent admission for flu for 2-3 days was having severe RUQ/rt lower chest wall during admission had CTA chest and CT abd- no PE, showed some irritation near ribs/muscles. 2 days ago developed bruise to rt flank, sleeps on left side now spread across abdomen and developed bruise left hand, PCP concerned about liver disease. no h/o liver disease, +smoker denies excessive EtOH use. no trauma. not on blood thinners, no lovenox on recent admission. no bleeding elsewhere. cough improving slightly, still with SOB intermittently exertional. no syncope. no fevers. +sweats

## 2018-01-12 NOTE — ED PROVIDER NOTE - PLAN OF CARE
1. Return to ED for worsening, progressive or any other concerning symptoms   2. Follow up with your primary care doctor in 2-3days   3. Take Tylenol up to 650 mg every 6 hours as needed for pain.   4. Take oxycodone 5 mg every 6 hours as needed for severe pain; do not drink alcohol while taking this medication.   5. Use the albuterol every 4-6 hours as needed for cough/shortness of breathe

## 2018-01-12 NOTE — ED PROVIDER NOTE - PHYSICAL EXAMINATION
Gen: NAD, AOx3, morbidly obese  Head: NCAT  HEENT: PERRL, oral mucosa moist, normal conjunctiva, neck supple, no petechia, no scleral jaundice  Lung: decreased BS b/l with mild expiratory wheeze  CV: rrr, no murmur, Normal perfusion  Abd: soft, NTND, ecchymosis from rt flank across abdomen diffusely nontender  MSK: No edema, no visible deformities  Neuro: No focal neurologic deficits  Skin: No rash, see Abd, no petechiae  Psych: normal affect

## 2018-01-12 NOTE — ED PROVIDER NOTE - NS ED ROS FT
ROS: +CP/SOB. +cough. no fever. no n/v/d/c. +abd pain. +rash. +bleeding. no urinary complaints. no weakness. no vision changes. no HA. no neck/back pain. no extremity swelling/deformity. No change in mental status.

## 2018-01-13 VITALS
RESPIRATION RATE: 18 BRPM | TEMPERATURE: 99 F | DIASTOLIC BLOOD PRESSURE: 79 MMHG | SYSTOLIC BLOOD PRESSURE: 139 MMHG | HEART RATE: 85 BPM | OXYGEN SATURATION: 96 %

## 2018-01-13 PROBLEM — I10 ESSENTIAL (PRIMARY) HYPERTENSION: Chronic | Status: ACTIVE | Noted: 2018-01-06

## 2018-01-13 LAB
ALBUMIN SERPL ELPH-MCNC: 3.1 G/DL — LOW (ref 3.3–5)
ALP SERPL-CCNC: 62 U/L — SIGNIFICANT CHANGE UP (ref 40–120)
ALT FLD-CCNC: 43 U/L RC — SIGNIFICANT CHANGE UP (ref 10–45)
ANION GAP SERPL CALC-SCNC: 15 MMOL/L — SIGNIFICANT CHANGE UP (ref 5–17)
APTT BLD: 23.4 SEC — LOW (ref 27.5–37.4)
AST SERPL-CCNC: 25 U/L — SIGNIFICANT CHANGE UP (ref 10–40)
BASE EXCESS BLDV CALC-SCNC: 0.2 MMOL/L — SIGNIFICANT CHANGE UP (ref -2–2)
BILIRUB SERPL-MCNC: 0.4 MG/DL — SIGNIFICANT CHANGE UP (ref 0.2–1.2)
BUN SERPL-MCNC: 18 MG/DL — SIGNIFICANT CHANGE UP (ref 7–23)
CA-I SERPL-SCNC: 1.18 MMOL/L — SIGNIFICANT CHANGE UP (ref 1.12–1.3)
CALCIUM SERPL-MCNC: 8.5 MG/DL — SIGNIFICANT CHANGE UP (ref 8.4–10.5)
CHLORIDE BLDV-SCNC: 107 MMOL/L — SIGNIFICANT CHANGE UP (ref 96–108)
CHLORIDE SERPL-SCNC: 105 MMOL/L — SIGNIFICANT CHANGE UP (ref 96–108)
CK SERPL-CCNC: 151 U/L — SIGNIFICANT CHANGE UP (ref 30–200)
CO2 BLDV-SCNC: 27 MMOL/L — SIGNIFICANT CHANGE UP (ref 22–30)
CO2 SERPL-SCNC: 21 MMOL/L — LOW (ref 22–31)
CREAT SERPL-MCNC: 1.2 MG/DL — SIGNIFICANT CHANGE UP (ref 0.5–1.3)
GAS PNL BLDV: 139 MMOL/L — SIGNIFICANT CHANGE UP (ref 136–145)
GAS PNL BLDV: SIGNIFICANT CHANGE UP
GLUCOSE BLDV-MCNC: 119 MG/DL — HIGH (ref 70–99)
GLUCOSE SERPL-MCNC: 123 MG/DL — HIGH (ref 70–99)
HCO3 BLDV-SCNC: 26 MMOL/L — SIGNIFICANT CHANGE UP (ref 21–29)
HCT VFR BLDA CALC: 42 % — SIGNIFICANT CHANGE UP (ref 39–50)
HGB BLD CALC-MCNC: 13.7 G/DL — SIGNIFICANT CHANGE UP (ref 13–17)
HOROWITZ INDEX BLDV+IHG-RTO: SIGNIFICANT CHANGE UP
INR BLD: 1.06 RATIO — SIGNIFICANT CHANGE UP (ref 0.88–1.16)
LACTATE BLDV-MCNC: 3.5 MMOL/L — HIGH (ref 0.7–2)
PCO2 BLDV: 48 MMHG — SIGNIFICANT CHANGE UP (ref 35–50)
PH BLDV: 7.35 — SIGNIFICANT CHANGE UP (ref 7.35–7.45)
PO2 BLDV: 49 MMHG — HIGH (ref 25–45)
POTASSIUM BLDV-SCNC: 3.7 MMOL/L — SIGNIFICANT CHANGE UP (ref 3.5–5)
POTASSIUM SERPL-MCNC: 4.2 MMOL/L — SIGNIFICANT CHANGE UP (ref 3.5–5.3)
POTASSIUM SERPL-SCNC: 4.2 MMOL/L — SIGNIFICANT CHANGE UP (ref 3.5–5.3)
PROT SERPL-MCNC: 6.5 G/DL — SIGNIFICANT CHANGE UP (ref 6–8.3)
PROTHROM AB SERPL-ACNC: 11.6 SEC — SIGNIFICANT CHANGE UP (ref 9.8–12.7)
SAO2 % BLDV: 79 % — SIGNIFICANT CHANGE UP (ref 67–88)
SODIUM SERPL-SCNC: 141 MMOL/L — SIGNIFICANT CHANGE UP (ref 135–145)

## 2018-01-13 PROCEDURE — 71046 X-RAY EXAM CHEST 2 VIEWS: CPT

## 2018-01-13 PROCEDURE — 84132 ASSAY OF SERUM POTASSIUM: CPT

## 2018-01-13 PROCEDURE — 85027 COMPLETE CBC AUTOMATED: CPT

## 2018-01-13 PROCEDURE — 82803 BLOOD GASES ANY COMBINATION: CPT

## 2018-01-13 PROCEDURE — 82565 ASSAY OF CREATININE: CPT

## 2018-01-13 PROCEDURE — 82550 ASSAY OF CK (CPK): CPT

## 2018-01-13 PROCEDURE — 82947 ASSAY GLUCOSE BLOOD QUANT: CPT

## 2018-01-13 PROCEDURE — 82435 ASSAY OF BLOOD CHLORIDE: CPT

## 2018-01-13 PROCEDURE — 83605 ASSAY OF LACTIC ACID: CPT

## 2018-01-13 PROCEDURE — 82330 ASSAY OF CALCIUM: CPT

## 2018-01-13 PROCEDURE — 85014 HEMATOCRIT: CPT

## 2018-01-13 PROCEDURE — 85610 PROTHROMBIN TIME: CPT

## 2018-01-13 PROCEDURE — 84295 ASSAY OF SERUM SODIUM: CPT

## 2018-01-13 PROCEDURE — 80053 COMPREHEN METABOLIC PANEL: CPT

## 2018-01-13 PROCEDURE — 94640 AIRWAY INHALATION TREATMENT: CPT

## 2018-01-13 PROCEDURE — 99283 EMERGENCY DEPT VISIT LOW MDM: CPT | Mod: 25

## 2018-01-13 PROCEDURE — 85730 THROMBOPLASTIN TIME PARTIAL: CPT

## 2018-01-13 RX ORDER — ALBUTEROL 90 UG/1
3 AEROSOL, METERED ORAL
Qty: 84 | Refills: 0 | OUTPATIENT
Start: 2018-01-13 | End: 2018-02-11

## 2018-01-13 RX ORDER — OXYCODONE HYDROCHLORIDE 5 MG/1
1 TABLET ORAL
Qty: 20 | Refills: 0 | OUTPATIENT
Start: 2018-01-13 | End: 2018-01-17

## 2018-01-13 RX ADMIN — Medication 3 MILLILITER(S): at 00:28

## 2018-03-02 ENCOUNTER — APPOINTMENT (OUTPATIENT)
Dept: CARDIOLOGY | Facility: CLINIC | Age: 48
End: 2018-03-02
Payer: COMMERCIAL

## 2018-03-02 ENCOUNTER — NON-APPOINTMENT (OUTPATIENT)
Age: 48
End: 2018-03-02

## 2018-03-02 VITALS — SYSTOLIC BLOOD PRESSURE: 106 MMHG | OXYGEN SATURATION: 98 % | HEART RATE: 83 BPM | DIASTOLIC BLOOD PRESSURE: 74 MMHG

## 2018-03-02 VITALS — WEIGHT: 315 LBS | SYSTOLIC BLOOD PRESSURE: 108 MMHG | DIASTOLIC BLOOD PRESSURE: 78 MMHG

## 2018-03-02 DIAGNOSIS — I10 ESSENTIAL (PRIMARY) HYPERTENSION: ICD-10-CM

## 2018-03-02 DIAGNOSIS — F17.200 NICOTINE DEPENDENCE, UNSPECIFIED, UNCOMPLICATED: ICD-10-CM

## 2018-03-02 DIAGNOSIS — Z78.9 OTHER SPECIFIED HEALTH STATUS: ICD-10-CM

## 2018-03-02 DIAGNOSIS — Z82.49 FAMILY HISTORY OF ISCHEMIC HEART DISEASE AND OTHER DISEASES OF THE CIRCULATORY SYSTEM: ICD-10-CM

## 2018-03-02 DIAGNOSIS — Z01.818 ENCOUNTER FOR OTHER PREPROCEDURAL EXAMINATION: ICD-10-CM

## 2018-03-02 DIAGNOSIS — F15.90 OTHER STIMULANT USE, UNSPECIFIED, UNCOMPLICATED: ICD-10-CM

## 2018-03-02 DIAGNOSIS — G47.33 OBSTRUCTIVE SLEEP APNEA (ADULT) (PEDIATRIC): ICD-10-CM

## 2018-03-02 PROCEDURE — 93000 ELECTROCARDIOGRAM COMPLETE: CPT

## 2018-03-02 PROCEDURE — 99214 OFFICE O/P EST MOD 30 MIN: CPT

## 2018-03-02 RX ORDER — ONDANSETRON 4 MG/1
4 TABLET ORAL
Qty: 40 | Refills: 0 | Status: COMPLETED | COMMUNITY
Start: 2018-01-09

## 2018-03-02 RX ORDER — AMLODIPINE BESYLATE 5 MG/1
5 TABLET ORAL
Qty: 30 | Refills: 0 | Status: DISCONTINUED | COMMUNITY
Start: 2017-05-11

## 2018-03-02 RX ORDER — PREDNISONE 50 MG/1
50 TABLET ORAL
Qty: 5 | Refills: 0 | Status: COMPLETED | COMMUNITY
Start: 2017-10-20

## 2018-03-02 RX ORDER — OXYCODONE 5 MG/1
5 TABLET ORAL
Qty: 20 | Refills: 0 | Status: COMPLETED | COMMUNITY
Start: 2018-01-13

## 2018-03-02 RX ORDER — TADALAFIL 5 MG/1
5 TABLET, FILM COATED ORAL
Qty: 30 | Refills: 0 | Status: ACTIVE | COMMUNITY
Start: 2017-05-11

## 2018-03-02 RX ORDER — OSELTAMIVIR PHOSPHATE 75 MG/1
75 CAPSULE ORAL
Qty: 10 | Refills: 0 | Status: COMPLETED | COMMUNITY
Start: 2018-01-09

## 2018-03-02 RX ORDER — AMLODIPINE BESYLATE 10 MG/1
10 TABLET ORAL
Qty: 30 | Refills: 0 | Status: ACTIVE | COMMUNITY
Start: 2018-02-08

## 2018-03-02 RX ORDER — PREDNISONE 20 MG/1
20 TABLET ORAL
Qty: 3 | Refills: 0 | Status: COMPLETED | COMMUNITY
Start: 2018-01-09

## 2018-03-02 RX ORDER — ALPRAZOLAM 0.5 MG/1
0.5 TABLET ORAL
Qty: 10 | Refills: 0 | Status: ACTIVE | COMMUNITY
Start: 2018-01-09

## 2018-03-02 RX ORDER — BENZONATATE 200 MG/1
200 CAPSULE ORAL
Qty: 60 | Refills: 0 | Status: DISCONTINUED | COMMUNITY
Start: 2018-01-13

## 2018-03-02 RX ORDER — CEFUROXIME AXETIL 500 MG/1
500 TABLET ORAL
Qty: 8 | Refills: 0 | Status: DISCONTINUED | COMMUNITY
Start: 2018-01-09 | End: 2018-03-02

## 2018-03-02 RX ORDER — AZITHROMYCIN 500 MG/1
500 TABLET, FILM COATED ORAL
Qty: 2 | Refills: 0 | Status: DISCONTINUED | COMMUNITY
Start: 2018-01-09

## 2018-03-02 RX ORDER — ALBUTEROL SULFATE 2.5 MG/3ML
(2.5 MG/3ML) SOLUTION RESPIRATORY (INHALATION)
Qty: 75 | Refills: 0 | Status: DISCONTINUED | COMMUNITY
Start: 2018-01-13 | End: 2018-03-02

## 2018-03-02 RX ORDER — LORATADINE 10 MG/1
10 TABLET ORAL
Qty: 30 | Refills: 0 | Status: COMPLETED | COMMUNITY
Start: 2018-01-09

## 2018-03-02 RX ORDER — IBUPROFEN 800 MG/1
800 TABLET, FILM COATED ORAL
Qty: 90 | Refills: 0 | Status: COMPLETED | COMMUNITY
Start: 2017-11-29

## 2018-03-02 RX ORDER — KETOROLAC TROMETHAMINE 10 MG/1
10 TABLET, FILM COATED ORAL
Qty: 20 | Refills: 0 | Status: COMPLETED | COMMUNITY
Start: 2018-01-09

## 2018-03-02 RX ORDER — BENZONATATE 100 MG/1
100 CAPSULE ORAL
Qty: 30 | Refills: 0 | Status: DISCONTINUED | COMMUNITY
Start: 2018-01-09 | End: 2018-03-02

## 2018-03-02 RX ORDER — TESTOSTERONE CYPIONATE 200 MG/ML
200 INJECTION, SOLUTION INTRAMUSCULAR
Qty: 10 | Refills: 0 | Status: ACTIVE | COMMUNITY
Start: 2017-10-02

## 2018-03-02 RX ORDER — AMOXICILLIN AND CLAVULANATE POTASSIUM 875; 125 MG/1; MG/1
875-125 TABLET, COATED ORAL
Qty: 20 | Refills: 0 | Status: DISCONTINUED | COMMUNITY
Start: 2017-12-21

## 2018-03-02 RX ORDER — AZITHROMYCIN 250 MG/1
250 TABLET, FILM COATED ORAL
Qty: 6 | Refills: 0 | Status: DISCONTINUED | COMMUNITY
Start: 2018-02-02

## 2018-03-02 RX ORDER — ANASTROZOLE TABLETS 1 MG/1
1 TABLET ORAL
Qty: 12 | Refills: 0 | Status: ACTIVE | COMMUNITY
Start: 2017-11-28

## 2018-03-02 RX ORDER — PANTOPRAZOLE 40 MG/1
40 TABLET, DELAYED RELEASE ORAL
Qty: 15 | Refills: 0 | Status: COMPLETED | COMMUNITY
Start: 2018-01-09

## 2018-03-02 RX ORDER — BUDESONIDE AND FORMOTEROL FUMARATE DIHYDRATE 160; 4.5 UG/1; UG/1
160-4.5 AEROSOL RESPIRATORY (INHALATION)
Qty: 10 | Refills: 0 | Status: COMPLETED | COMMUNITY
Start: 2018-01-09

## 2018-03-02 RX ORDER — VALSARTAN AND HYDROCHLOROTHIAZIDE 320; 12.5 MG/1; MG/1
320-12.5 TABLET, FILM COATED ORAL
Qty: 30 | Refills: 0 | Status: ACTIVE | COMMUNITY
Start: 2017-11-15

## 2018-03-02 RX ORDER — DEXAMETHASONE 4 MG/1
4 TABLET ORAL
Qty: 15 | Refills: 0 | Status: COMPLETED | COMMUNITY
Start: 2017-12-21

## 2018-03-02 RX ORDER — TORSEMIDE 20 MG/1
20 TABLET ORAL
Qty: 30 | Refills: 0 | Status: ACTIVE | COMMUNITY
Start: 2017-10-18

## 2018-03-02 RX ORDER — OXYCODONE AND ACETAMINOPHEN 5; 325 MG/1; MG/1
5-325 TABLET ORAL
Qty: 20 | Refills: 0 | Status: COMPLETED | COMMUNITY
Start: 2018-01-09

## 2018-03-02 RX ORDER — POTASSIUM CHLORIDE 1500 MG/1
20 TABLET, FILM COATED, EXTENDED RELEASE ORAL
Qty: 30 | Refills: 0 | Status: COMPLETED | COMMUNITY
Start: 2017-10-18

## 2018-03-20 ENCOUNTER — APPOINTMENT (OUTPATIENT)
Dept: CARDIOLOGY | Facility: CLINIC | Age: 48
End: 2018-03-20
Payer: COMMERCIAL

## 2018-03-20 PROCEDURE — 93306 TTE W/DOPPLER COMPLETE: CPT

## 2018-03-29 ENCOUNTER — RESULT REVIEW (OUTPATIENT)
Age: 48
End: 2018-03-29

## 2018-03-29 DIAGNOSIS — I77.810 THORACIC AORTIC ECTASIA: ICD-10-CM

## 2018-04-16 ENCOUNTER — FORM ENCOUNTER (OUTPATIENT)
Age: 48
End: 2018-04-16

## 2018-04-17 ENCOUNTER — OUTPATIENT (OUTPATIENT)
Dept: OUTPATIENT SERVICES | Facility: HOSPITAL | Age: 48
LOS: 1 days | End: 2018-04-17
Payer: COMMERCIAL

## 2018-04-17 ENCOUNTER — APPOINTMENT (OUTPATIENT)
Dept: CT IMAGING | Facility: CLINIC | Age: 48
End: 2018-04-17
Payer: COMMERCIAL

## 2018-04-17 DIAGNOSIS — I77.810 THORACIC AORTIC ECTASIA: ICD-10-CM

## 2018-04-17 PROCEDURE — 71275 CT ANGIOGRAPHY CHEST: CPT

## 2018-04-17 PROCEDURE — 71275 CT ANGIOGRAPHY CHEST: CPT | Mod: 26

## 2018-04-17 PROCEDURE — 82565 ASSAY OF CREATININE: CPT

## 2018-05-07 ENCOUNTER — RESULT REVIEW (OUTPATIENT)
Age: 48
End: 2018-05-07

## 2018-07-25 PROBLEM — Z78.9 ALCOHOL USE: Status: ACTIVE | Noted: 2018-03-02

## 2018-11-05 NOTE — ED ADULT NURSE NOTE - PSH
Quality 130: Documentation Of Current Medications In The Medical Record: Current Medications Documented
No significant past surgical history
Quality 431: Preventive Care And Screening: Unhealthy Alcohol Use - Screening: Patient screened for unhealthy alcohol use using a single question and scores less than 2 times per year
Quality 131: Pain Assessment And Follow-Up: Pain assessment using a standardized tool is documented as negative, no follow-up plan required
Quality 400a: One-Time Screening For Hepatitis C Virus (Hcv) For All Patients: Documentation of patient reason(s) for not receiving one-time screening for HCV infection
Quality 110: Preventive Care And Screening: Influenza Immunization: Influenza Immunization not Administered for Documented Reasons.
Detail Level: Detailed

## 2019-02-27 ENCOUNTER — TRANSCRIPTION ENCOUNTER (OUTPATIENT)
Age: 49
End: 2019-02-27

## 2021-01-01 ENCOUNTER — EMERGENCY (EMERGENCY)
Facility: HOSPITAL | Age: 51
LOS: 0 days | End: 2021-10-17
Attending: EMERGENCY MEDICINE
Payer: COMMERCIAL

## 2021-01-01 VITALS — WEIGHT: 300.05 LBS | HEIGHT: 74 IN

## 2021-01-01 DIAGNOSIS — I46.9 CARDIAC ARREST, CAUSE UNSPECIFIED: ICD-10-CM

## 2021-01-01 DIAGNOSIS — I10 ESSENTIAL (PRIMARY) HYPERTENSION: ICD-10-CM

## 2021-01-01 PROCEDURE — U0005: CPT

## 2021-01-01 PROCEDURE — 99285 EMERGENCY DEPT VISIT HI MDM: CPT

## 2021-01-01 PROCEDURE — 33016 PERICARDIOCENTESIS W/IMAGING: CPT

## 2021-01-01 PROCEDURE — 99285 EMERGENCY DEPT VISIT HI MDM: CPT | Mod: 25

## 2021-01-01 PROCEDURE — 82962 GLUCOSE BLOOD TEST: CPT

## 2021-01-01 PROCEDURE — 31500 INSERT EMERGENCY AIRWAY: CPT

## 2021-01-01 PROCEDURE — U0003: CPT

## 2021-10-17 NOTE — ED ADULT TRIAGE NOTE - CHIEF COMPLAINT QUOTE
Pt BIBEMS witnessed cardiac arrest at 1916.  BVM and CPR in progress at arrival.  IO in left leg.  4 epis and 1 atropine given PTA.  Pt in asystole.

## 2021-10-17 NOTE — ED ADULT NURSE REASSESSMENT NOTE - NS ED NURSE REASSESS COMMENT FT1
PAtient who presented to ED in cardiac arrest at 1945 and  at  accepted to medical examiners office under THIAGO Devries, reference number . Family and PMD made aware.

## 2021-10-17 NOTE — ED PROVIDER NOTE - NSICDXFAMILYHX_GEN_ALL_CORE_FT
FAMILY HISTORY:  Father  Still living? No  Family history of colon cancer in father, Age at diagnosis: Age Unknown

## 2021-10-17 NOTE — ED PROCEDURE NOTE - CPROC ED TIME OUT STATEMENT1
“Patient's name, , procedure and correct site were confirmed during the [place] Timeout.”
“Patient's name, , procedure and correct site were confirmed during the [place] Timeout.”

## 2021-10-17 NOTE — ED PROVIDER NOTE - ENMT, MLM
Airway patent, Nasal mucosa clear. Throat has no vesicles, no oropharyngeal exudates and uvula is midline. Oral pharynx clear. Mucous membrane slightly dry Airway patent, Nasal mucosa clear. Throat has no vesicles, no oropharyngeal exudates and uvula is midline. Oral pharynx grossly clear. Mucous membrane slightly dry

## 2021-10-17 NOTE — ED PROVIDER NOTE - MUSCULOSKELETAL, MLM
No spontaneous femoral pulse, No gross deformities or obvious tenderness to pain, (+) Left IO line in place (+) femoral pulse upon chest compressions

## 2021-10-17 NOTE — ED PROVIDER NOTE - CLINICAL SUMMARY MEDICAL DECISION MAKING FREE TEXT BOX
52 y/o obese M hx of HTN unspecific cardiac hx BIBA from [facility] in cardiac arrest with BLS/ACLS in progress including IO epi x4 IO atropine x1 w/o clinical response. (+) asystole upon ED arrival. Plan: pt intubated IV line secured, IV epi, CPR, bedside echo suspicion for pericardial effusion questionable Tamponade. Efforts for pericardial centesis, no fluid or blood obtained. No clinical response to interventions. Pt declared  at 20:04. 52 y/o obese M hx of HTN, ?unspecific cardiac hx BIBA from [facility] in cardiac arrest with BLS/ACLS in progress including IO epi x4 IO atropine x1 w/o clinical response. (+) asystole upon ED arrival.   Plan: pt intubated, IV line secured, IV epi, CPR, bedside echo suspicion for pericardial effusion questionable Tamponade. Emergent pericardiocentesis attempted: no fluid or blood obtained. No clinical response to interventions. Pt declared  at 20:04. 50 y/o obese M hx of HTN, BIBA from [facility] in cardiac arrest with BLS/ACLS in progress including IO epi x4 IO atropine x1 w/o clinical response. (+) asystole upon ED arrival.   Plan: pt intubated, IV line secured, IV epi, CPR, bedside echo suspicion for pericardial effusion questionable Tamponade. Emergent pericardiocentesis attempted: no fluid or blood obtained. No clinical response to interventions. Pt declared  at 20:04.

## 2021-10-17 NOTE — ED PROCEDURE NOTE - CPROC ED PERICARDIO DETAIL1
The correct location was identified. A needle was introduced into the pericardial space./Ultrasound guidance was used.

## 2021-10-17 NOTE — ED PROVIDER NOTE - CARDIAC, MLM
No heart activity No heart activity auscultated. No central pulses without chest compressions, + palpable upon compressions.

## 2021-10-17 NOTE — ED PROVIDER NOTE - RESPIRATORY, MLM
No spontaneous respiration, postintubation no spontaneous respiration No spontaneous respiration.  BS grossly clear/equal upon BVM bagging

## 2021-10-18 PROBLEM — I10 ESSENTIAL (PRIMARY) HYPERTENSION: Chronic | Status: ACTIVE | Noted: 2017-07-22

## 2021-10-18 LAB — SARS-COV-2 RNA SPEC QL NAA+PROBE: SIGNIFICANT CHANGE UP

## 2023-04-19 NOTE — H&P ADULT - PROBLEM/PLAN-5
Addended by: PINA SZYMANSKI on: 4/19/2023 09:36 AM     Modules accepted: Orders    
DISPLAY PLAN FREE TEXT

## 2024-09-24 NOTE — ED PROVIDER NOTE - OBJECTIVE STATEMENT
48 yo M presents to ED via EMS after pt's wife found him on the floor drooling from the mouth after reported increased alcohol intake all day.  Pt awake on arrival with slurred speech and ALOOB with no obvious signs of trauma negative - no chest pain